# Patient Record
Sex: FEMALE | Race: WHITE | NOT HISPANIC OR LATINO | Employment: UNEMPLOYED | URBAN - NONMETROPOLITAN AREA
[De-identification: names, ages, dates, MRNs, and addresses within clinical notes are randomized per-mention and may not be internally consistent; named-entity substitution may affect disease eponyms.]

---

## 2017-01-17 ENCOUNTER — HOSPITAL ENCOUNTER (OUTPATIENT)
Facility: HOSPITAL | Age: 27
Setting detail: HOSPITAL OUTPATIENT SURGERY
End: 2017-01-17
Attending: OBSTETRICS & GYNECOLOGY | Admitting: OBSTETRICS & GYNECOLOGY

## 2017-01-25 ENCOUNTER — APPOINTMENT (OUTPATIENT)
Dept: PREADMISSION TESTING | Facility: HOSPITAL | Age: 27
End: 2017-01-25

## 2017-01-25 VITALS
WEIGHT: 108.25 LBS | HEIGHT: 63 IN | HEART RATE: 78 BPM | BODY MASS INDEX: 19.18 KG/M2 | SYSTOLIC BLOOD PRESSURE: 109 MMHG | RESPIRATION RATE: 12 BRPM | DIASTOLIC BLOOD PRESSURE: 69 MMHG | OXYGEN SATURATION: 98 %

## 2017-01-25 LAB
ALBUMIN SERPL-MCNC: 4.5 G/DL (ref 3.5–5)
ALBUMIN/GLOB SERPL: 1.4 G/DL (ref 1.1–2.5)
ALP SERPL-CCNC: 92 U/L (ref 24–120)
ALT SERPL W P-5'-P-CCNC: 42 U/L (ref 0–54)
ANION GAP SERPL CALCULATED.3IONS-SCNC: 12 MMOL/L (ref 4–13)
AST SERPL-CCNC: 23 U/L (ref 7–45)
BASOPHILS # BLD AUTO: 0.02 10*3/MM3 (ref 0–0.2)
BASOPHILS NFR BLD AUTO: 0.3 % (ref 0–2)
BILIRUB SERPL-MCNC: 0.5 MG/DL (ref 0.1–1)
BUN BLD-MCNC: 7 MG/DL (ref 5–21)
BUN/CREAT SERPL: 8.9 (ref 7–25)
CALCIUM SPEC-SCNC: 9.2 MG/DL (ref 8.4–10.4)
CHLORIDE SERPL-SCNC: 105 MMOL/L (ref 98–110)
CO2 SERPL-SCNC: 24 MMOL/L (ref 24–31)
CREAT BLD-MCNC: 0.79 MG/DL (ref 0.5–1.4)
DEPRECATED RDW RBC AUTO: 44.4 FL (ref 40–54)
EOSINOPHIL # BLD AUTO: 0.07 10*3/MM3 (ref 0–0.7)
EOSINOPHIL NFR BLD AUTO: 1.2 % (ref 0–4)
ERYTHROCYTE [DISTWIDTH] IN BLOOD BY AUTOMATED COUNT: 14 % (ref 12–15)
GFR SERPL CREATININE-BSD FRML MDRD: 89 ML/MIN/1.73
GLOBULIN UR ELPH-MCNC: 3.2 GM/DL
GLUCOSE BLD-MCNC: 82 MG/DL (ref 70–100)
HCT VFR BLD AUTO: 37.1 % (ref 37–47)
HGB BLD-MCNC: 12.1 G/DL (ref 12–16)
IMM GRANULOCYTES # BLD: 0.01 10*3/MM3 (ref 0–0.03)
IMM GRANULOCYTES NFR BLD: 0.2 % (ref 0–5)
LYMPHOCYTES # BLD AUTO: 2.85 10*3/MM3 (ref 0.72–4.86)
LYMPHOCYTES NFR BLD AUTO: 46.9 % (ref 15–45)
MCH RBC QN AUTO: 28.6 PG (ref 28–32)
MCHC RBC AUTO-ENTMCNC: 32.6 G/DL (ref 33–36)
MCV RBC AUTO: 87.7 FL (ref 82–98)
MONOCYTES # BLD AUTO: 0.33 10*3/MM3 (ref 0.19–1.3)
MONOCYTES NFR BLD AUTO: 5.4 % (ref 4–12)
NEUTROPHILS # BLD AUTO: 2.8 10*3/MM3 (ref 1.87–8.4)
NEUTROPHILS NFR BLD AUTO: 46 % (ref 39–78)
PLATELET # BLD AUTO: 271 10*3/MM3 (ref 130–400)
PMV BLD AUTO: 10.7 FL (ref 6–12)
POTASSIUM BLD-SCNC: 3.5 MMOL/L (ref 3.5–5.3)
PROT SERPL-MCNC: 7.7 G/DL (ref 6.3–8.7)
RBC # BLD AUTO: 4.23 10*6/MM3 (ref 4.2–5.4)
SODIUM BLD-SCNC: 141 MMOL/L (ref 135–145)
WBC NRBC COR # BLD: 6.08 10*3/MM3 (ref 4.8–10.8)

## 2017-01-25 PROCEDURE — 36415 COLL VENOUS BLD VENIPUNCTURE: CPT

## 2017-01-25 PROCEDURE — 80053 COMPREHEN METABOLIC PANEL: CPT | Performed by: OBSTETRICS & GYNECOLOGY

## 2017-01-25 PROCEDURE — 85025 COMPLETE CBC W/AUTO DIFF WBC: CPT | Performed by: OBSTETRICS & GYNECOLOGY

## 2017-01-25 NOTE — DISCHARGE INSTRUCTIONS
DAY OF SURGERY INSTRUCTIONS        YOUR SURGEON: Dr. Sebastian Navas    PROCEDURE: Laparoscopic tubal ligation with Filshie clips.    DATE OF SURGERY: Tuesday, 1/31/17    ARRIVAL TIME: AS DIRECTED BY OFFICE    DAY OF SURGERY TAKE ONLY THESE MEDICATIONS: NONE.        BEFORE YOU COME TO THE HOSPITAL  (Pre-op instructions)  • Do not eat, drink, smoke or chew gum after midnight the night before surgery.  This also includes no mints.  • Morning of surgery take only the medicines you have been instructed with a sip of water unless otherwise instructed  by your physician.  • Do not shave, wear makeup or dark nail polish.  • Remove all jewelry including rings.  • Leave anything you consider valuable at home.  • Leave your suitcase in the car until after your surgery.  • Bring the following with you if applicable:  o Picture ID and insurance, Medicare or Medicaid cards  o Co-pay/deductible required by insurance (cash, check, credit card)  o Medications (no narcotics) in original bottles (not a list) including all over-the-counter medications.  o Copy of advance directive, living will or power-of- documents if not brought to PAT  o CPAP or BIPAP mask and tubing  o Skin prep instruction sheet  o Relaxation aids (MP3 player, book, magazine)  • Confirm your arrival time with you surgeon the day before your surgery (surgery times are subject to change)  • On the day of surgery check in at registration located at the main entrance of the hospital.       Outpatient Surgery Guidelines, Adult  Outpatient procedures are those for which the person having the procedure is allowed to go home the same day as the procedure. Various procedures are done on an outpatient basis. You should follow some general guidelines if you will be having an outpatient procedure.  LET YOUR HEALTH CARE PROVIDER KNOW ABOUT:  · Any allergies you have.  · All medicines you are taking, including vitamins, herbs, eye drops, creams, and over-the-counter  medicines.  · Previous problems you or members of your family have had with the use of anesthetics.  · Any blood disorders you have.  · Previous surgeries you have had.  · Medical conditions you have.  RISKS AND COMPLICATIONS  Your health care provider will discuss possible risks and complications with you before surgery. Common risks and complications include:    · Problems due to the use of anesthetics.  · Blood loss and replacement (does not apply to minor surgical procedures).  · Temporary increase in pain due to surgery.  · Uncorrected pain or problems that the surgery was meant to correct.  · Infection.  · New damage.  BEFORE THE PROCEDURE  · Ask your health care provider about changing or stopping your regular medicines. You may need to stop taking certain medicines in the days or weeks before the procedure.  · Stop smoking at least 2 weeks before surgery. This lowers your risk for complications during and after surgery. Ask your health care provider for help with this if needed.  · Eat your usual meals and a light supper the day before surgery. Continue fluid intake. Do not drink alcohol.  · Do not eat or drink after midnight the night before your surgery.   · Arrange for someone to take you home and to stay with you for 24 hours after the procedure. Medicine given for your procedure may affect your ability to drive or to care for yourself.  · Call your health care provider's office if you develop an illness or problem that may prevent you from safely having your procedure.  AFTER THE PROCEDURE  After surgery, you will be taken to a recovery area, where your progress will be monitored. If there are no complications, you will be allowed to go home when you are awake, stable, and taking fluids well. You may have numbness around the surgical site. Healing will take some time. You will have tenderness at the surgical site and may have some swelling and bruising. You may also have some nausea.  HOME CARE  INSTRUCTIONS  · Do not drive for 24 hours, or as directed by your health care provider. Do not drive while taking prescription pain medicines.  · Do not drink alcohol for 24 hours.  · Do not make important decisions or sign legal documents for 24 hours.  · You may resume a normal diet and activities as directed.  · Do not lift anything heavier than 10 pounds (4.5 kg) or play contact sports until your health care provider says it is okay.  · Change your bandages (dressings) as directed.  · Only take over-the-counter or prescription medicines as directed by your health care provider.  · Follow up with your health care provider as directed.  SEEK MEDICAL CARE IF:  · You have increased bleeding (more than a small spot) from the surgical site.  · You have redness, swelling, or increasing pain in the wound.  · You see pus coming from the wound.  · You have a fever.  · You notice a bad smell coming from the wound or dressing.  · You feel lightheaded or faint.  · You develop a rash.  · You have trouble breathing.  · You develop allergies.  MAKE SURE YOU:  · Understand these instructions.  · Will watch your condition.  · Will get help right away if you are not doing well or get worse.     This information is not intended to replace advice given to you by your health care provider. Make sure you discuss any questions you have with your health care provider.     Document Released: 09/12/2002 Document Revised: 05/03/2016 Document Reviewed: 05/22/2014  [employer] Interactive Patient Education ©2016 [employer]       Fall Prevention in Hospitals, Adult  As a hospital patient, your condition and the treatments you receive can increase your risk for falls. Some additional risk factors for falls in a hospital include:  · Being in an unfamiliar environment.  · Being on bed rest.  · Your surgery.  · Taking certain medicines.  · Your tubing requirements, such as intravenous (IV) therapy or catheters.  It is important that you learn how  to decrease fall risks while at the hospital. Below are important tips that can help prevent falls.  SAFETY TIPS FOR PREVENTING FALLS  Talk about your risk of falling.  · Ask your health care provider why you are at risk for falling. Is it your medicine, illness, tubing placement, or something else?  · Make a plan with your health care provider to keep you safe from falls.  · Ask your health care provider or pharmacist about side effects of your medicines. Some medicines can make you dizzy or affect your coordination.  Ask for help.  · Ask for help before getting out of bed. You may need to press your call button.  · Ask for assistance in getting safely to the toilet.  · Ask for a walker or cane to be put at your bedside. Ask that most of the side rails on your bed be placed up before your health care provider leaves the room.  · Ask family or friends to sit with you.  · Ask for things that are out of your reach, such as your glasses, hearing aids, telephone, bedside table, or call button.  Follow these tips to avoid falling:  · Stay lying or seated, rather than standing, while waiting for help.  · Wear rubber-soled slippers or shoes whenever you walk in the hospital.  · Avoid quick, sudden movements.  ¨ Change positions slowly.  ¨ Sit on the side of your bed before standing.  ¨ Stand up slowly and wait before you start to walk.  · Let your health care provider know if there is a spill on the floor.  · Pay careful attention to the medical equipment, electrical cords, and tubes around you.  · When you need help, use your call button by your bed or in the bathroom. Wait for one of your health care providers to help you.  · If you feel dizzy or unsure of your footing, return to bed and wait for assistance.  · Avoid being distracted by the TV, telephone, or another person in your room.  · Do not lean or support yourself on rolling objects, such as IV poles or bedside tables.     This information is not intended to  replace advice given to you by your health care provider. Make sure you discuss any questions you have with your health care provider.     Document Released: 12/15/2001 Document Revised: 01/08/2016 Document Reviewed: 08/25/2013  [employer] Interactive Patient Education ©2016 [employer]       Surgical Site Infections FAQs  What is a Surgical Site Infection (SSI)?  A surgical site infection is an infection that occurs after surgery in the part of the body where the surgery took place. Most patients who have surgery do not develop an infection. However, infections develop in about 1 to 3 out of every 100 patients who have surgery.  Some of the common symptoms of a surgical site infection are:  · Redness and pain around the area where you had surgery  · Drainage of cloudy fluid from your surgical wound  · Fever  Can SSIs be treated?  Yes. Most surgical site infections can be treated with antibiotics. The antibiotic given to you depends on the bacteria (germs) causing the infection. Sometimes patients with SSIs also need another surgery to treat the infection.  What are some of the things that hospitals are doing to prevent SSIs?  To prevent SSIs, doctors, nurses, and other healthcare providers:  · Clean their hands and arms up to their elbows with an antiseptic agent just before the surgery.  · Clean their hands with soap and water or an alcohol-based hand rub before and after caring for each patient.  · May remove some of your hair immediately before your surgery using electric clippers if the hair is in the same area where the procedure will occur. They should not shave you with a razor.  · Wear special hair covers, masks, gowns, and gloves during surgery to keep the surgery area clean.  · Give you antibiotics before your surgery starts. In most cases, you should get antibiotics within 60 minutes before the surgery starts and the antibiotics should be stopped within 24 hours after surgery.  · Clean the skin at the  site of your surgery with a special soap that kills germs.  What can I do to help prevent SSIs?  Before your surgery:  · Tell your doctor about other medical problems you may have. Health problems such as allergies, diabetes, and obesity could affect your surgery and your treatment.  · Quit smoking. Patients who smoke get more infections. Talk to your doctor about how you can quit before your surgery.  · Do not shave near where you will have surgery. Shaving with a razor can irritate your skin and make it easier to develop an infection.  At the time of your surgery:  · Speak up if someone tries to shave you with a razor before surgery. Ask why you need to be shaved and talk with your surgeon if you have any concerns.  · Ask if you will get antibiotics before surgery.  After your surgery:  · Make sure that your healthcare providers clean their hands before examining you, either with soap and water or an alcohol-based hand rub.  · If you do not see your providers clean their hands, please ask them to do so.  · Family and friends who visit you should not touch the surgical wound or dressings.  · Family and friends should clean their hands with soap and water or an alcohol-based hand rub before and after visiting you. If you do not see them clean their hands, ask them to clean their hands.  What do I need to do when I go home from the hospital?  · Before you go home, your doctor or nurse should explain everything you need to know about taking care of your wound. Make sure you understand how to care for your wound before you leave the hospital.  · Always clean your hands before and after caring for your wound.  · Before you go home, make sure you know who to contact if you have questions or problems after you get home.  · If you have any symptoms of an infection, such as redness and pain at the surgery site, drainage, or fever, call your doctor immediately.  If you have additional questions, please ask your doctor or  nurse.  Developed and co-sponsored by The Society for Healthcare Epidemiology of [place] (SHEA); Infectious Diseases Society of [place] (IDSA); American Hospital Association; Association for Professionals in Infection Control and Epidemiology (APIC); Centers for Disease Control and Prevention (CDC); and The Joint Commission.     This information is not intended to replace advice given to you by your health care provider. Make sure you discuss any questions you have with your health care provider.     Document Released: 12/23/2014 Document Revised: 01/08/2016 Document Reviewed: 03/02/2016  [employer] Interactive Patient Education ©2016 [employer]       [facility]  CHG 4% Patient Instruction Sheet    Preparing the Skin Before Surgery  Preparing or “prepping” skin before surgery can reduce the risk of infection at the surgical site. To make the process easier,[facility] has chosen 4% Chlorhexidine Gluconate (CHG) antiseptic solution.   The steps below outline the prepping process and should be carefully followed.                                                                                                                                                      Prep the skin at the following time(s):                                                      We recommend you shower the night before surgery, and again the morning of surgery with the 4% CHG antiseptic solution using half of the bottle and a cloth each time.  Dress in clean clothes/sleepwear after showering.  See instructions below for application.          Do not apply any lotions or moisturizers.       Do not shave the area to be prepped for at least 2 days prior to surgery.    Clipping the hair may be done immediately prior to your surgery at the hospital    if needed.    Directions:  Thoroughly rinse your body with water.  Apply 4% CHG to a cloth and wash skin gently, paying special attention to the operative site.  Rinse again thoroughly.  Once you  have begun using this product do not apply anything else to your skin. If itching or redness persists, rinse affected areas and discontinue use.    When using this product:  • Keep out of eyes, ears, and mouth.  • If solution should contact these areas, rinse out promptly and thoroughly with water.  • For external use only.  • Do not use in genital area, on your face or head.

## 2017-01-25 NOTE — MR AVS SNAPSHOT
"                        Juanita Cedillo   2017 1:30 PM   Appointment    Provider:  [facility] PAT 34 RM 4   Department:  [facility] PREADMISSION T   Dept Phone:  [phone]                Your Full Care Plan              Your Updated Medication List          This list is accurate as of: 17  1:43 PM.  Always use your most recent med list.                ferrous sulfate 325 (65 FE) MG tablet               Bilibot Signup     [facility] Bilibot allows you to send messages to your doctor, view your test results, renew your prescriptions, schedule appointments, and more. To sign up, go to [email or web address] and click on the Sign Up Now link in the New User? box. Enter your Bilibot Activation Code exactly as it appears below along with the last four digits of your Social Security Number and your Date of Birth () to complete the sign-up process. If you do not sign up before the expiration date, you must request a new code.    Bilibot Activation Code: [email or web address]  Expires: 2017  1:42 PM    If you have questions, you can email [email or web address] or call [phone] to talk to our Bilibot staff. Remember, Bilibot is NOT to be used for urgent needs. For medical emergencies, dial 911.               Other Info from Your Visit           Allergies     No Known Allergies      Vital Signs     Blood Pressure Pulse Respirations Height Weight Last Menstrual Period    109/69 (BP Location: Right arm) 78 12 63\" (160 cm) 108 lb 4 oz (49.1 kg) 2017    Oxygen Saturation Body Mass Index Smoking Status             98% 19.18 kg/m2 Former Smoker           Discharge Instructions         DAY OF SURGERY INSTRUCTIONS        YOUR SURGEON: Dr. Sebastian Navas    PROCEDURE: Laparoscopic tubal ligation with Filshie clips.    DATE OF SURGERY: Tuesday, 17    ARRIVAL TIME: AS DIRECTED BY OFFICE    DAY OF SURGERY TAKE ONLY THESE MEDICATIONS: NONE.        BEFORE YOU COME TO THE HOSPITAL  (Pre-op " instructions)  • Do not eat, drink, smoke or chew gum after midnight the night before surgery.  This also includes no mints.  • Morning of surgery take only the medicines you have been instructed with a sip of water unless otherwise instructed  by your physician.  • Do not shave, wear makeup or dark nail polish.  • Remove all jewelry including rings.  • Leave anything you consider valuable at home.  • Leave your suitcase in the car until after your surgery.  • Bring the following with you if applicable:  o Picture ID and insurance, Medicare or Medicaid cards  o Co-pay/deductible required by insurance (cash, check, credit card)  o Medications (no narcotics) in original bottles (not a list) including all over-the-counter medications.  o Copy of advance directive, living will or power-of- documents if not brought to PAT  o CPAP or BIPAP mask and tubing  o Skin prep instruction sheet  o Relaxation aids (MP3 player, book, magazine)  • Confirm your arrival time with you surgeon the day before your surgery (surgery times are subject to change)  • On the day of surgery check in at registration located at the main entrance of the hospital.       Outpatient Surgery Guidelines, Adult  Outpatient procedures are those for which the person having the procedure is allowed to go home the same day as the procedure. Various procedures are done on an outpatient basis. You should follow some general guidelines if you will be having an outpatient procedure.  LET YOUR HEALTH CARE PROVIDER KNOW ABOUT:  · Any allergies you have.  · All medicines you are taking, including vitamins, herbs, eye drops, creams, and over-the-counter medicines.  · Previous problems you or members of your family have had with the use of anesthetics.  · Any blood disorders you have.  · Previous surgeries you have had.  · Medical conditions you have.  RISKS AND COMPLICATIONS  Your health care provider will discuss possible risks and complications with you  before surgery. Common risks and complications include:    · Problems due to the use of anesthetics.  · Blood loss and replacement (does not apply to minor surgical procedures).  · Temporary increase in pain due to surgery.  · Uncorrected pain or problems that the surgery was meant to correct.  · Infection.  · New damage.  BEFORE THE PROCEDURE  · Ask your health care provider about changing or stopping your regular medicines. You may need to stop taking certain medicines in the days or weeks before the procedure.  · Stop smoking at least 2 weeks before surgery. This lowers your risk for complications during and after surgery. Ask your health care provider for help with this if needed.  · Eat your usual meals and a light supper the day before surgery. Continue fluid intake. Do not drink alcohol.  · Do not eat or drink after midnight the night before your surgery.   · Arrange for someone to take you home and to stay with you for 24 hours after the procedure. Medicine given for your procedure may affect your ability to drive or to care for yourself.  · Call your health care provider's office if you develop an illness or problem that may prevent you from safely having your procedure.  AFTER THE PROCEDURE  After surgery, you will be taken to a recovery area, where your progress will be monitored. If there are no complications, you will be allowed to go home when you are awake, stable, and taking fluids well. You may have numbness around the surgical site. Healing will take some time. You will have tenderness at the surgical site and may have some swelling and bruising. You may also have some nausea.  HOME CARE INSTRUCTIONS  · Do not drive for 24 hours, or as directed by your health care provider. Do not drive while taking prescription pain medicines.  · Do not drink alcohol for 24 hours.  · Do not make important decisions or sign legal documents for 24 hours.  · You may resume a normal diet and activities as  directed.  · Do not lift anything heavier than 10 pounds (4.5 kg) or play contact sports until your health care provider says it is okay.  · Change your bandages (dressings) as directed.  · Only take over-the-counter or prescription medicines as directed by your health care provider.  · Follow up with your health care provider as directed.  SEEK MEDICAL CARE IF:  · You have increased bleeding (more than a small spot) from the surgical site.  · You have redness, swelling, or increasing pain in the wound.  · You see pus coming from the wound.  · You have a fever.  · You notice a bad smell coming from the wound or dressing.  · You feel lightheaded or faint.  · You develop a rash.  · You have trouble breathing.  · You develop allergies.  MAKE SURE YOU:  · Understand these instructions.  · Will watch your condition.  · Will get help right away if you are not doing well or get worse.     This information is not intended to replace advice given to you by your health care provider. Make sure you discuss any questions you have with your health care provider.     Document Released: 09/12/2002 Document Revised: 05/03/2016 Document Reviewed: 05/22/2014  [employer] Interactive Patient Education ©2016 [employer]       Fall Prevention in Hospitals, Adult  As a hospital patient, your condition and the treatments you receive can increase your risk for falls. Some additional risk factors for falls in a hospital include:  · Being in an unfamiliar environment.  · Being on bed rest.  · Your surgery.  · Taking certain medicines.  · Your tubing requirements, such as intravenous (IV) therapy or catheters.  It is important that you learn how to decrease fall risks while at the hospital. Below are important tips that can help prevent falls.  SAFETY TIPS FOR PREVENTING FALLS  Talk about your risk of falling.  · Ask your health care provider why you are at risk for falling. Is it your medicine, illness, tubing placement, or something  else?  · Make a plan with your health care provider to keep you safe from falls.  · Ask your health care provider or pharmacist about side effects of your medicines. Some medicines can make you dizzy or affect your coordination.  Ask for help.  · Ask for help before getting out of bed. You may need to press your call button.  · Ask for assistance in getting safely to the toilet.  · Ask for a walker or cane to be put at your bedside. Ask that most of the side rails on your bed be placed up before your health care provider leaves the room.  · Ask family or friends to sit with you.  · Ask for things that are out of your reach, such as your glasses, hearing aids, telephone, bedside table, or call button.  Follow these tips to avoid falling:  · Stay lying or seated, rather than standing, while waiting for help.  · Wear rubber-soled slippers or shoes whenever you walk in the hospital.  · Avoid quick, sudden movements.  ¨ Change positions slowly.  ¨ Sit on the side of your bed before standing.  ¨ Stand up slowly and wait before you start to walk.  · Let your health care provider know if there is a spill on the floor.  · Pay careful attention to the medical equipment, electrical cords, and tubes around you.  · When you need help, use your call button by your bed or in the bathroom. Wait for one of your health care providers to help you.  · If you feel dizzy or unsure of your footing, return to bed and wait for assistance.  · Avoid being distracted by the TV, telephone, or another person in your room.  · Do not lean or support yourself on rolling objects, such as IV poles or bedside tables.     This information is not intended to replace advice given to you by your health care provider. Make sure you discuss any questions you have with your health care provider.     Document Released: 12/15/2001 Document Revised: 01/08/2016 Document Reviewed: 08/25/2013  Elsevier Interactive Patient Education ©2016 Elsevier  Inc.       Surgical Site Infections FAQs  What is a Surgical Site Infection (SSI)?  A surgical site infection is an infection that occurs after surgery in the part of the body where the surgery took place. Most patients who have surgery do not develop an infection. However, infections develop in about 1 to 3 out of every 100 patients who have surgery.  Some of the common symptoms of a surgical site infection are:  · Redness and pain around the area where you had surgery  · Drainage of cloudy fluid from your surgical wound  · Fever  Can SSIs be treated?  Yes. Most surgical site infections can be treated with antibiotics. The antibiotic given to you depends on the bacteria (germs) causing the infection. Sometimes patients with SSIs also need another surgery to treat the infection.  What are some of the things that hospitals are doing to prevent SSIs?  To prevent SSIs, doctors, nurses, and other healthcare providers:  · Clean their hands and arms up to their elbows with an antiseptic agent just before the surgery.  · Clean their hands with soap and water or an alcohol-based hand rub before and after caring for each patient.  · May remove some of your hair immediately before your surgery using electric clippers if the hair is in the same area where the procedure will occur. They should not shave you with a razor.  · Wear special hair covers, masks, gowns, and gloves during surgery to keep the surgery area clean.  · Give you antibiotics before your surgery starts. In most cases, you should get antibiotics within 60 minutes before the surgery starts and the antibiotics should be stopped within 24 hours after surgery.  · Clean the skin at the site of your surgery with a special soap that kills germs.  What can I do to help prevent SSIs?  Before your surgery:  · Tell your doctor about other medical problems you may have. Health problems such as allergies, diabetes, and obesity could affect your surgery and your  treatment.  · Quit smoking. Patients who smoke get more infections. Talk to your doctor about how you can quit before your surgery.  · Do not shave near where you will have surgery. Shaving with a razor can irritate your skin and make it easier to develop an infection.  At the time of your surgery:  · Speak up if someone tries to shave you with a razor before surgery. Ask why you need to be shaved and talk with your surgeon if you have any concerns.  · Ask if you will get antibiotics before surgery.  After your surgery:  · Make sure that your healthcare providers clean their hands before examining you, either with soap and water or an alcohol-based hand rub.  · If you do not see your providers clean their hands, please ask them to do so.  · Family and friends who visit you should not touch the surgical wound or dressings.  · Family and friends should clean their hands with soap and water or an alcohol-based hand rub before and after visiting you. If you do not see them clean their hands, ask them to clean their hands.  What do I need to do when I go home from the hospital?  · Before you go home, your doctor or nurse should explain everything you need to know about taking care of your wound. Make sure you understand how to care for your wound before you leave the hospital.  · Always clean your hands before and after caring for your wound.  · Before you go home, make sure you know who to contact if you have questions or problems after you get home.  · If you have any symptoms of an infection, such as redness and pain at the surgery site, drainage, or fever, call your doctor immediately.  If you have additional questions, please ask your doctor or nurse.  Developed and co-sponsored by The Society for Healthcare Epidemiology of [place] (SHEA); Infectious Diseases Society of [place] (IDSA); American Hospital Association; Association for Professionals in Infection Control and Epidemiology (APIC); Centers for Disease  Control and Prevention (CDC); and The Joint Commission.     This information is not intended to replace advice given to you by your health care provider. Make sure you discuss any questions you have with your health care provider.     Document Released: 12/23/2014 Document Revised: 01/08/2016 Document Reviewed: 03/02/2016  [employer] Interactive Patient Education ©2016 Elsevier Inc.       [facility]  CHG 4% Patient Instruction Sheet    Preparing the Skin Before Surgery  Preparing or “prepping” skin before surgery can reduce the risk of infection at the surgical site. To make the process easier,[facility] has chosen 4% Chlorhexidine Gluconate (CHG) antiseptic solution.   The steps below outline the prepping process and should be carefully followed.                                                                                                                                                      Prep the skin at the following time(s):                                                      We recommend you shower the night before surgery, and again the morning of surgery with the 4% CHG antiseptic solution using half of the bottle and a cloth each time.  Dress in clean clothes/sleepwear after showering.  See instructions below for application.          Do not apply any lotions or moisturizers.       Do not shave the area to be prepped for at least 2 days prior to surgery.    Clipping the hair may be done immediately prior to your surgery at the hospital    if needed.    Directions:  Thoroughly rinse your body with water.  Apply 4% CHG to a cloth and wash skin gently, paying special attention to the operative site.  Rinse again thoroughly.  Once you have begun using this product do not apply anything else to your skin. If itching or redness persists, rinse affected areas and discontinue use.    When using this product:  • Keep out of eyes, ears, and mouth.  • If solution should contact these areas, rinse out promptly  and thoroughly with water.  • For external use only.  • Do not use in genital area, on your face or head.             SYMPTOMS OF A STROKE    Call 911 or have someone take you to the Emergency Department if you have any of the following:    · Sudden numbness or weakness of your face, arm or leg especially on one side of the body  · Sudden confusion, diffiiculty speaking or trouble understanding   · Changes in your vision or loss of sight in one eye  · Sudden severe headache with no known cause  · sudden dizziness, trouble walking, loss of balance or coordination    It is important to seek emergency care right away if you have further stroke symptoms. If you get emergency help quickly, the powerful clot-dissolving medicines can reduce the disabilities caused by a stroke.     For more information:    American Stroke Association  5-281-7-STROKE  www.strokeassociation.org           IF YOU SMOKE OR USE TOBACCO PLEASE READ THE FOLLOWING:    Why is smoking bad for me?  Smoking increases the risk of heart disease, lung disease, vascular disease, stroke, and cancer.     If you smoke, STOP!    If you would like more information on quitting smoking, please visit the [employer] website: [email or web address]   This link will provide additional resources including the QUIT line and the [employer] the [employer] support groups.     For more information:    American Cancer Society  ([phone]    American Heart Association  1-157.356.2212

## 2018-04-24 ENCOUNTER — HOSPITAL ENCOUNTER (OUTPATIENT)
Dept: CT IMAGING | Age: 28
Discharge: HOME | End: 2018-04-24
Payer: COMMERCIAL

## 2018-04-24 ENCOUNTER — HOSPITAL ENCOUNTER (OUTPATIENT)
Dept: GENERAL RADIOLOGY | Age: 28
Discharge: HOME | End: 2018-04-24
Payer: COMMERCIAL

## 2018-04-24 ENCOUNTER — OFFICE VISIT (OUTPATIENT)
Dept: URGENT CARE | Age: 28
End: 2018-04-24
Payer: COMMERCIAL

## 2018-04-24 VITALS
OXYGEN SATURATION: 97 % | SYSTOLIC BLOOD PRESSURE: 112 MMHG | TEMPERATURE: 97.6 F | HEART RATE: 95 BPM | DIASTOLIC BLOOD PRESSURE: 70 MMHG | RESPIRATION RATE: 20 BRPM | HEIGHT: 63 IN | WEIGHT: 102.6 LBS | BODY MASS INDEX: 18.18 KG/M2

## 2018-04-24 DIAGNOSIS — R11.2 NON-INTRACTABLE VOMITING WITH NAUSEA, UNSPECIFIED VOMITING TYPE: ICD-10-CM

## 2018-04-24 DIAGNOSIS — N20.0 RENAL CALCULUS OR STONE: ICD-10-CM

## 2018-04-24 DIAGNOSIS — K21.9 GASTROESOPHAGEAL REFLUX DISEASE WITHOUT ESOPHAGITIS: Primary | ICD-10-CM

## 2018-04-24 DIAGNOSIS — R10.84 GENERALIZED ABDOMINAL PAIN: ICD-10-CM

## 2018-04-24 DIAGNOSIS — R31.1 BENIGN ESSENTIAL MICROSCOPIC HEMATURIA: ICD-10-CM

## 2018-04-24 DIAGNOSIS — R10.9 ABDOMINAL PAIN, UNSPECIFIED ABDOMINAL LOCATION: Primary | ICD-10-CM

## 2018-04-24 LAB
ALBUMIN SERPL-MCNC: 4.5 G/DL (ref 3.5–5.2)
ALP BLD-CCNC: 64 U/L (ref 35–104)
ALT SERPL-CCNC: 12 U/L (ref 5–33)
AMYLASE: 53 U/L (ref 28–100)
ANION GAP SERPL CALCULATED.3IONS-SCNC: 11 MMOL/L (ref 7–19)
APPEARANCE FLUID: ABNORMAL
AST SERPL-CCNC: 12 U/L (ref 5–32)
BASOPHILS ABSOLUTE: 0 K/UL (ref 0–0.2)
BASOPHILS RELATIVE PERCENT: 0.7 % (ref 0–1)
BILIRUB SERPL-MCNC: 0.3 MG/DL (ref 0.2–1.2)
BILIRUBIN, POC: NEGATIVE
BLOOD URINE, POC: ABNORMAL
BUN BLDV-MCNC: 10 MG/DL (ref 6–20)
CALCIUM SERPL-MCNC: 9.1 MG/DL (ref 8.6–10)
CHLORIDE BLD-SCNC: 105 MMOL/L (ref 98–111)
CLARITY, POC: CLEAR
CO2: 25 MMOL/L (ref 22–29)
COLOR, POC: YELLOW
CONTROL: NORMAL
CREAT SERPL-MCNC: 0.6 MG/DL (ref 0.5–0.9)
EOSINOPHILS ABSOLUTE: 0.2 K/UL (ref 0–0.6)
EOSINOPHILS RELATIVE PERCENT: 3.8 % (ref 0–5)
GFR NON-AFRICAN AMERICAN: >60
GLUCOSE BLD-MCNC: 81 MG/DL (ref 74–109)
GLUCOSE URINE, POC: NEGATIVE
HCT VFR BLD CALC: 41.7 % (ref 37–47)
HEMOGLOBIN: 13.5 G/DL (ref 12–16)
KETONES, POC: ABNORMAL
LEUKOCYTE EST, POC: NEGATIVE
LIPASE: 20 U/L (ref 13–60)
LYMPHOCYTES ABSOLUTE: 2.3 K/UL (ref 1.1–4.5)
LYMPHOCYTES RELATIVE PERCENT: 39.8 % (ref 20–40)
MCH RBC QN AUTO: 29.7 PG (ref 27–31)
MCHC RBC AUTO-ENTMCNC: 32.4 G/DL (ref 33–37)
MCV RBC AUTO: 91.9 FL (ref 81–99)
MONOCYTES ABSOLUTE: 0.3 K/UL (ref 0–0.9)
MONOCYTES RELATIVE PERCENT: 4.9 % (ref 0–10)
NEUTROPHILS ABSOLUTE: 2.9 K/UL (ref 1.5–7.5)
NEUTROPHILS RELATIVE PERCENT: 50.5 % (ref 50–65)
NITRITE, POC: NEGATIVE
PDW BLD-RTO: 13.2 % (ref 11.5–14.5)
PH, POC: 5.5
PLATELET # BLD: 325 K/UL (ref 130–400)
PMV BLD AUTO: 10 FL (ref 9.4–12.3)
POTASSIUM SERPL-SCNC: 4.1 MMOL/L (ref 3.5–5)
PREGNANCY TEST URINE, POC: NEGATIVE
PROTEIN, POC: NEGATIVE
RBC # BLD: 4.54 M/UL (ref 4.2–5.4)
SODIUM BLD-SCNC: 141 MMOL/L (ref 136–145)
SPECIFIC GRAVITY, POC: 1.03
TOTAL PROTEIN: 7.2 G/DL (ref 6.6–8.7)
UROBILINOGEN, POC: 0.2
WBC # BLD: 5.7 K/UL (ref 4.8–10.8)

## 2018-04-24 PROCEDURE — 74176 CT ABD & PELVIS W/O CONTRAST: CPT

## 2018-04-24 PROCEDURE — 74018 RADEX ABDOMEN 1 VIEW: CPT

## 2018-04-24 PROCEDURE — 81002 URINALYSIS NONAUTO W/O SCOPE: CPT | Performed by: NURSE PRACTITIONER

## 2018-04-24 PROCEDURE — 81025 URINE PREGNANCY TEST: CPT | Performed by: NURSE PRACTITIONER

## 2018-04-24 PROCEDURE — 99214 OFFICE O/P EST MOD 30 MIN: CPT | Performed by: NURSE PRACTITIONER

## 2018-04-24 RX ORDER — TAMSULOSIN HYDROCHLORIDE 0.4 MG/1
0.4 CAPSULE ORAL DAILY
Qty: 7 CAPSULE | Refills: 0 | Status: SHIPPED | OUTPATIENT
Start: 2018-04-24 | End: 2018-05-01

## 2018-04-24 RX ORDER — RANITIDINE 150 MG/1
150 TABLET ORAL 2 TIMES DAILY
Qty: 60 TABLET | Refills: 0 | Status: SHIPPED | OUTPATIENT
Start: 2018-04-24

## 2018-04-24 ASSESSMENT — ENCOUNTER SYMPTOMS
CONSTIPATION: 0
NAUSEA: 1
BACK PAIN: 1
SHORTNESS OF BREATH: 0
VOMITING: 1
ABDOMINAL PAIN: 1
DIARRHEA: 0
BLOOD IN STOOL: 0

## 2020-05-09 LAB — EXTERNAL HEPATITIS B SURFACE ANTIGEN: NEGATIVE

## 2020-11-09 LAB
EXTERNAL ABO GROUPING: NORMAL
EXTERNAL ANTIBODY SCREEN: NEGATIVE
EXTERNAL HEPATITIS C AB: NEGATIVE
EXTERNAL RH FACTOR: POSITIVE
EXTERNAL RUBELLA QUALITATIVE: NORMAL
EXTERNAL SYPHILIS RPR SCREEN: NORMAL
HIV1 P24 AG SERPL QL IA: NORMAL

## 2021-04-07 ENCOUNTER — HOSPITAL ENCOUNTER (OUTPATIENT)
Facility: HOSPITAL | Age: 31
Discharge: HOME | End: 2021-04-07
Attending: OBSTETRICS & GYNECOLOGY | Admitting: OBSTETRICS & GYNECOLOGY

## 2021-04-07 ENCOUNTER — HOSPITAL ENCOUNTER (OUTPATIENT)
Facility: HOSPITAL | Age: 31
End: 2021-04-07
Attending: OBSTETRICS & GYNECOLOGY | Admitting: OBSTETRICS & GYNECOLOGY

## 2021-04-07 VITALS
HEART RATE: 102 BPM | DIASTOLIC BLOOD PRESSURE: 66 MMHG | TEMPERATURE: 98.9 F | WEIGHT: 137.6 LBS | BODY MASS INDEX: 24.38 KG/M2 | SYSTOLIC BLOOD PRESSURE: 104 MMHG | HEIGHT: 63 IN | RESPIRATION RATE: 18 BRPM

## 2021-04-07 LAB
ALBUMIN SERPL-MCNC: 3.4 G/DL (ref 3.5–5.2)
ALBUMIN/GLOB SERPL: 1.2 G/DL
ALP SERPL-CCNC: 95 U/L (ref 39–117)
ALT SERPL W P-5'-P-CCNC: <5 U/L (ref 1–33)
ANION GAP SERPL CALCULATED.3IONS-SCNC: 7 MMOL/L (ref 5–15)
AST SERPL-CCNC: 18 U/L (ref 1–32)
BASOPHILS # BLD AUTO: 0.03 10*3/MM3 (ref 0–0.2)
BASOPHILS NFR BLD AUTO: 0.3 % (ref 0–1.5)
BILIRUB SERPL-MCNC: <0.2 MG/DL (ref 0–1.2)
BILIRUB UR QL STRIP: NEGATIVE
BLOODY SPECIMEN?: NO
BUN SERPL-MCNC: 5 MG/DL (ref 6–20)
BUN/CREAT SERPL: 13.5 (ref 7–25)
CALCIUM SPEC-SCNC: 8.9 MG/DL (ref 8.6–10.5)
CHLORIDE SERPL-SCNC: 108 MMOL/L (ref 98–107)
CLARITY UR: ABNORMAL
CO2 SERPL-SCNC: 23 MMOL/L (ref 22–29)
COLOR UR: YELLOW
CREAT SERPL-MCNC: 0.37 MG/DL (ref 0.57–1)
DEPRECATED RDW RBC AUTO: 43.2 FL (ref 37–54)
EOSINOPHIL # BLD AUTO: 0.23 10*3/MM3 (ref 0–0.4)
EOSINOPHIL NFR BLD AUTO: 2.1 % (ref 0.3–6.2)
ERYTHROCYTE [DISTWIDTH] IN BLOOD BY AUTOMATED COUNT: 13.2 % (ref 12.3–15.4)
FIBRONECTIN FETAL VAG QL: NEGATIVE
GFR SERPL CREATININE-BSD FRML MDRD: >150 ML/MIN/1.73
GLOBULIN UR ELPH-MCNC: 2.8 GM/DL
GLUCOSE SERPL-MCNC: 94 MG/DL (ref 65–99)
GLUCOSE UR STRIP-MCNC: NEGATIVE MG/DL
HCT VFR BLD AUTO: 27.6 % (ref 34–46.6)
HGB BLD-MCNC: 9.2 G/DL (ref 12–15.9)
HGB UR QL STRIP.AUTO: NEGATIVE
IMM GRANULOCYTES # BLD AUTO: 0.06 10*3/MM3 (ref 0–0.05)
IMM GRANULOCYTES NFR BLD AUTO: 0.5 % (ref 0–0.5)
KETONES UR QL STRIP: NEGATIVE
LEUKOCYTE ESTERASE UR QL STRIP.AUTO: NEGATIVE
LYMPHOCYTES # BLD AUTO: 3.29 10*3/MM3 (ref 0.7–3.1)
LYMPHOCYTES NFR BLD AUTO: 29.7 % (ref 19.6–45.3)
MCH RBC QN AUTO: 30 PG (ref 26.6–33)
MCHC RBC AUTO-ENTMCNC: 33.3 G/DL (ref 31.5–35.7)
MCV RBC AUTO: 89.9 FL (ref 79–97)
MONOCYTES # BLD AUTO: 0.61 10*3/MM3 (ref 0.1–0.9)
MONOCYTES NFR BLD AUTO: 5.5 % (ref 5–12)
NEUTROPHILS NFR BLD AUTO: 6.84 10*3/MM3 (ref 1.7–7)
NEUTROPHILS NFR BLD AUTO: 61.9 % (ref 42.7–76)
NITRITE UR QL STRIP: NEGATIVE
NRBC BLD AUTO-RTO: 0 /100 WBC (ref 0–0.2)
PH UR STRIP.AUTO: 8 [PH] (ref 5–8)
PLATELET # BLD AUTO: 343 10*3/MM3 (ref 140–450)
PMV BLD AUTO: 9.7 FL (ref 6–12)
POTASSIUM SERPL-SCNC: 3.4 MMOL/L (ref 3.5–5.2)
PROT SERPL-MCNC: 6.2 G/DL (ref 6–8.5)
PROT UR QL STRIP: NEGATIVE
RBC # BLD AUTO: 3.07 10*6/MM3 (ref 3.77–5.28)
SODIUM SERPL-SCNC: 138 MMOL/L (ref 136–145)
SP GR UR STRIP: 1.01 (ref 1–1.03)
UROBILINOGEN UR QL STRIP: ABNORMAL
WBC # BLD AUTO: 11.06 10*3/MM3 (ref 3.4–10.8)

## 2021-04-07 PROCEDURE — 36415 COLL VENOUS BLD VENIPUNCTURE: CPT | Performed by: OBSTETRICS & GYNECOLOGY

## 2021-04-07 PROCEDURE — 85025 COMPLETE CBC W/AUTO DIFF WBC: CPT | Performed by: OBSTETRICS & GYNECOLOGY

## 2021-04-07 PROCEDURE — 81003 URINALYSIS AUTO W/O SCOPE: CPT | Performed by: OBSTETRICS & GYNECOLOGY

## 2021-04-07 PROCEDURE — 82731 ASSAY OF FETAL FIBRONECTIN: CPT | Performed by: OBSTETRICS & GYNECOLOGY

## 2021-04-07 PROCEDURE — 80053 COMPREHEN METABOLIC PANEL: CPT | Performed by: OBSTETRICS & GYNECOLOGY

## 2021-04-07 PROCEDURE — G0463 HOSPITAL OUTPT CLINIC VISIT: HCPCS

## 2021-04-07 PROCEDURE — 87086 URINE CULTURE/COLONY COUNT: CPT | Performed by: OBSTETRICS & GYNECOLOGY

## 2021-04-07 PROCEDURE — G0378 HOSPITAL OBSERVATION PER HR: HCPCS

## 2021-04-07 RX ORDER — DOCUSATE SODIUM 100 MG/1
100 CAPSULE, LIQUID FILLED ORAL 2 TIMES DAILY
COMMUNITY
End: 2021-12-17

## 2021-04-07 RX ORDER — LEVOTHYROXINE SODIUM 0.07 MG/1
75 TABLET ORAL DAILY
COMMUNITY
End: 2022-04-27

## 2021-04-07 RX ORDER — PRENATAL VIT NO.126/IRON/FOLIC 28MG-0.8MG
TABLET ORAL DAILY
COMMUNITY
End: 2021-12-17

## 2021-04-07 RX ORDER — MAGNESIUM CHLORIDE 64 MG
TABLET, DELAYED RELEASE (ENTERIC COATED) ORAL DAILY
COMMUNITY
End: 2021-12-17

## 2021-04-08 ENCOUNTER — TELEPHONE (OUTPATIENT)
Dept: FAMILY MEDICINE CLINIC | Facility: CLINIC | Age: 31
End: 2021-04-08

## 2021-04-08 PROBLEM — Z34.90 PREGNANCY: Status: ACTIVE | Noted: 2021-04-08

## 2021-04-08 LAB — BACTERIA SPEC AEROBE CULT: NO GROWTH

## 2021-04-08 NOTE — TELEPHONE ENCOUNTER
Patient recently discharged from hospital, no pcp listed.  I spoke with her and she said she did not want to establish care with a pcp at this time, her OB is taking care of all of her meds.

## 2021-05-28 ENCOUNTER — ANESTHESIA (OUTPATIENT)
Dept: LABOR AND DELIVERY | Facility: HOSPITAL | Age: 31
End: 2021-05-28

## 2021-05-28 ENCOUNTER — ANESTHESIA EVENT (OUTPATIENT)
Dept: LABOR AND DELIVERY | Facility: HOSPITAL | Age: 31
End: 2021-05-28

## 2021-05-28 ENCOUNTER — HOSPITAL ENCOUNTER (INPATIENT)
Facility: HOSPITAL | Age: 31
LOS: 2 days | Discharge: HOME | End: 2021-05-30
Attending: OBSTETRICS & GYNECOLOGY | Admitting: OBSTETRICS & GYNECOLOGY

## 2021-05-28 PROBLEM — Z37.9 NORMAL LABOR: Status: ACTIVE | Noted: 2021-05-28

## 2021-05-28 LAB
ABO GROUP BLD: NORMAL
BLD GP AB SCN SERPL QL: NEGATIVE
DEPRECATED RDW RBC AUTO: 43.4 FL (ref 37–54)
ERYTHROCYTE [DISTWIDTH] IN BLOOD BY AUTOMATED COUNT: 13.6 % (ref 12.3–15.4)
HCT VFR BLD AUTO: 30.7 % (ref 34–46.6)
HGB BLD-MCNC: 10.2 G/DL (ref 12–15.9)
MCH RBC QN AUTO: 29.1 PG (ref 26.6–33)
MCHC RBC AUTO-ENTMCNC: 33.2 G/DL (ref 31.5–35.7)
MCV RBC AUTO: 87.5 FL (ref 79–97)
PLATELET # BLD AUTO: 368 10*3/MM3 (ref 140–450)
PMV BLD AUTO: 9.9 FL (ref 6–12)
RBC # BLD AUTO: 3.51 10*6/MM3 (ref 3.77–5.28)
RH BLD: POSITIVE
T&S EXPIRATION DATE: NORMAL
WBC # BLD AUTO: 10.83 10*3/MM3 (ref 3.4–10.8)

## 2021-05-28 PROCEDURE — 85027 COMPLETE CBC AUTOMATED: CPT | Performed by: OBSTETRICS & GYNECOLOGY

## 2021-05-28 PROCEDURE — C1755 CATHETER, INTRASPINAL: HCPCS | Performed by: NURSE ANESTHETIST, CERTIFIED REGISTERED

## 2021-05-28 PROCEDURE — 4A1HX4Z MONITORING OF PRODUCTS OF CONCEPTION, CARDIAC ELECTRICAL ACTIVITY, EXTERNAL APPROACH: ICD-10-PCS | Performed by: OBSTETRICS & GYNECOLOGY

## 2021-05-28 PROCEDURE — 86850 RBC ANTIBODY SCREEN: CPT | Performed by: OBSTETRICS & GYNECOLOGY

## 2021-05-28 PROCEDURE — 86901 BLOOD TYPING SEROLOGIC RH(D): CPT | Performed by: OBSTETRICS & GYNECOLOGY

## 2021-05-28 PROCEDURE — 25010000002 ROPIVACAINE PER 1 MG: Performed by: NURSE ANESTHETIST, CERTIFIED REGISTERED

## 2021-05-28 PROCEDURE — 25010000002 ONDANSETRON PER 1 MG: Performed by: OBSTETRICS & GYNECOLOGY

## 2021-05-28 PROCEDURE — 59409 OBSTETRICAL CARE: CPT | Performed by: OBSTETRICS & GYNECOLOGY

## 2021-05-28 PROCEDURE — 86900 BLOOD TYPING SEROLOGIC ABO: CPT | Performed by: OBSTETRICS & GYNECOLOGY

## 2021-05-28 PROCEDURE — 51702 INSERT TEMP BLADDER CATH: CPT

## 2021-05-28 RX ORDER — OXYTOCIN/0.9 % SODIUM CHLORIDE 30/500 ML
4 PLASTIC BAG, INJECTION (ML) INTRAVENOUS
Status: DISCONTINUED | OUTPATIENT
Start: 2021-05-28 | End: 2021-05-28

## 2021-05-28 RX ORDER — ONDANSETRON 2 MG/ML
4 INJECTION INTRAMUSCULAR; INTRAVENOUS EVERY 6 HOURS PRN
Status: DISCONTINUED | OUTPATIENT
Start: 2021-05-28 | End: 2021-05-29 | Stop reason: HOSPADM

## 2021-05-28 RX ORDER — ACETAMINOPHEN 325 MG/1
650 TABLET ORAL EVERY 4 HOURS PRN
Status: DISCONTINUED | OUTPATIENT
Start: 2021-05-28 | End: 2021-05-29 | Stop reason: HOSPADM

## 2021-05-28 RX ORDER — BUTORPHANOL TARTRATE 1 MG/ML
1 INJECTION, SOLUTION INTRAMUSCULAR; INTRAVENOUS
Status: CANCELLED | OUTPATIENT
Start: 2021-05-28

## 2021-05-28 RX ORDER — OXYTOCIN/0.9 % SODIUM CHLORIDE 30/500 ML
2-20 PLASTIC BAG, INJECTION (ML) INTRAVENOUS
Status: DISCONTINUED | OUTPATIENT
Start: 2021-05-28 | End: 2021-05-29

## 2021-05-28 RX ORDER — ONDANSETRON 2 MG/ML
4 INJECTION INTRAMUSCULAR; INTRAVENOUS EVERY 6 HOURS PRN
Status: CANCELLED | OUTPATIENT
Start: 2021-05-28

## 2021-05-28 RX ORDER — ONDANSETRON 4 MG/1
4 TABLET, FILM COATED ORAL EVERY 6 HOURS PRN
Status: CANCELLED | OUTPATIENT
Start: 2021-05-28

## 2021-05-28 RX ORDER — ROPIVACAINE HYDROCHLORIDE 2 MG/ML
INJECTION, SOLUTION EPIDURAL; INFILTRATION; PERINEURAL AS NEEDED
Status: DISCONTINUED | OUTPATIENT
Start: 2021-05-28 | End: 2021-05-29 | Stop reason: SURG

## 2021-05-28 RX ORDER — IBUPROFEN 600 MG/1
600 TABLET ORAL EVERY 8 HOURS PRN
Status: DISCONTINUED | OUTPATIENT
Start: 2021-05-28 | End: 2021-05-30 | Stop reason: HOSPADM

## 2021-05-28 RX ORDER — HYDROCODONE BITARTRATE AND ACETAMINOPHEN 5; 325 MG/1; MG/1
1 TABLET ORAL EVERY 4 HOURS PRN
Status: DISCONTINUED | OUTPATIENT
Start: 2021-05-28 | End: 2021-05-30 | Stop reason: HOSPADM

## 2021-05-28 RX ORDER — OXYTOCIN/0.9 % SODIUM CHLORIDE 30/500 ML
2-20 PLASTIC BAG, INJECTION (ML) INTRAVENOUS
Status: DISCONTINUED | OUTPATIENT
Start: 2021-05-28 | End: 2021-05-28

## 2021-05-28 RX ORDER — TERBUTALINE SULFATE 1 MG/ML
0.25 INJECTION, SOLUTION SUBCUTANEOUS AS NEEDED
Status: DISCONTINUED | OUTPATIENT
Start: 2021-05-28 | End: 2021-05-29 | Stop reason: HOSPADM

## 2021-05-28 RX ORDER — IBUPROFEN 800 MG/1
800 TABLET ORAL EVERY 8 HOURS PRN
Status: CANCELLED | OUTPATIENT
Start: 2021-05-28

## 2021-05-28 RX ORDER — SODIUM CHLORIDE 0.9 % (FLUSH) 0.9 %
1-10 SYRINGE (ML) INJECTION AS NEEDED
Status: DISCONTINUED | OUTPATIENT
Start: 2021-05-28 | End: 2021-05-29 | Stop reason: HOSPADM

## 2021-05-28 RX ORDER — OXYTOCIN/0.9 % SODIUM CHLORIDE 30/500 ML
999 PLASTIC BAG, INJECTION (ML) INTRAVENOUS ONCE
Status: DISCONTINUED | OUTPATIENT
Start: 2021-05-28 | End: 2021-05-29 | Stop reason: HOSPADM

## 2021-05-28 RX ORDER — OXYTOCIN/0.9 % SODIUM CHLORIDE 30/500 ML
125 PLASTIC BAG, INJECTION (ML) INTRAVENOUS CONTINUOUS PRN
Status: COMPLETED | OUTPATIENT
Start: 2021-05-28 | End: 2021-05-28

## 2021-05-28 RX ORDER — CARBOPROST TROMETHAMINE 250 UG/ML
250 INJECTION, SOLUTION INTRAMUSCULAR AS NEEDED
Status: DISCONTINUED | OUTPATIENT
Start: 2021-05-28 | End: 2021-05-29 | Stop reason: HOSPADM

## 2021-05-28 RX ORDER — BUTORPHANOL TARTRATE 1 MG/ML
1 INJECTION, SOLUTION INTRAMUSCULAR; INTRAVENOUS
Status: DISCONTINUED | OUTPATIENT
Start: 2021-05-28 | End: 2021-05-29 | Stop reason: HOSPADM

## 2021-05-28 RX ORDER — SODIUM CHLORIDE, SODIUM LACTATE, POTASSIUM CHLORIDE, CALCIUM CHLORIDE 600; 310; 30; 20 MG/100ML; MG/100ML; MG/100ML; MG/100ML
125 INJECTION, SOLUTION INTRAVENOUS CONTINUOUS
Status: DISCONTINUED | OUTPATIENT
Start: 2021-05-28 | End: 2021-05-29

## 2021-05-28 RX ORDER — OXYTOCIN/0.9 % SODIUM CHLORIDE 30/500 ML
250 PLASTIC BAG, INJECTION (ML) INTRAVENOUS CONTINUOUS
Status: DISPENSED | OUTPATIENT
Start: 2021-05-28 | End: 2021-05-28

## 2021-05-28 RX ORDER — LIDOCAINE HYDROCHLORIDE 10 MG/ML
5 INJECTION, SOLUTION EPIDURAL; INFILTRATION; INTRACAUDAL; PERINEURAL AS NEEDED
Status: DISCONTINUED | OUTPATIENT
Start: 2021-05-28 | End: 2021-05-29 | Stop reason: HOSPADM

## 2021-05-28 RX ORDER — LIDOCAINE HYDROCHLORIDE AND EPINEPHRINE 15; 5 MG/ML; UG/ML
INJECTION, SOLUTION EPIDURAL
Status: COMPLETED | OUTPATIENT
Start: 2021-05-28 | End: 2021-05-28

## 2021-05-28 RX ORDER — MISOPROSTOL 200 UG/1
800 TABLET ORAL AS NEEDED
Status: DISCONTINUED | OUTPATIENT
Start: 2021-05-28 | End: 2021-05-29 | Stop reason: HOSPADM

## 2021-05-28 RX ORDER — SODIUM CHLORIDE 0.9 % (FLUSH) 0.9 %
3 SYRINGE (ML) INJECTION EVERY 12 HOURS SCHEDULED
Status: DISCONTINUED | OUTPATIENT
Start: 2021-05-28 | End: 2021-05-29 | Stop reason: HOSPADM

## 2021-05-28 RX ORDER — LIDOCAINE HYDROCHLORIDE 20 MG/ML
20 INJECTION, SOLUTION EPIDURAL; INFILTRATION; INTRACAUDAL; PERINEURAL ONCE AS NEEDED
Status: DISCONTINUED | OUTPATIENT
Start: 2021-05-28 | End: 2021-05-29

## 2021-05-28 RX ORDER — EPHEDRINE SULFATE 50 MG/ML
10 INJECTION, SOLUTION INTRAVENOUS
Status: DISCONTINUED | OUTPATIENT
Start: 2021-05-28 | End: 2021-05-29 | Stop reason: HOSPADM

## 2021-05-28 RX ORDER — ONDANSETRON 4 MG/1
4 TABLET, FILM COATED ORAL EVERY 6 HOURS PRN
Status: DISCONTINUED | OUTPATIENT
Start: 2021-05-28 | End: 2021-05-29 | Stop reason: HOSPADM

## 2021-05-28 RX ORDER — METHYLERGONOVINE MALEATE 0.2 MG/ML
200 INJECTION INTRAVENOUS ONCE AS NEEDED
Status: DISCONTINUED | OUTPATIENT
Start: 2021-05-28 | End: 2021-05-29 | Stop reason: HOSPADM

## 2021-05-28 RX ADMIN — ONDANSETRON HYDROCHLORIDE 4 MG: 2 SOLUTION INTRAMUSCULAR; INTRAVENOUS at 23:20

## 2021-05-28 RX ADMIN — SODIUM CHLORIDE, POTASSIUM CHLORIDE, SODIUM LACTATE AND CALCIUM CHLORIDE 1000 ML: 600; 310; 30; 20 INJECTION, SOLUTION INTRAVENOUS at 16:00

## 2021-05-28 RX ADMIN — SODIUM CHLORIDE, POTASSIUM CHLORIDE, SODIUM LACTATE AND CALCIUM CHLORIDE 125 ML/HR: 600; 310; 30; 20 INJECTION, SOLUTION INTRAVENOUS at 09:16

## 2021-05-28 RX ADMIN — SODIUM CHLORIDE, POTASSIUM CHLORIDE, SODIUM LACTATE AND CALCIUM CHLORIDE 125 ML/HR: 600; 310; 30; 20 INJECTION, SOLUTION INTRAVENOUS at 17:11

## 2021-05-28 RX ADMIN — HYDROCODONE BITARTRATE AND ACETAMINOPHEN 1 TABLET: 5; 325 TABLET ORAL at 22:52

## 2021-05-28 RX ADMIN — OXYTOCIN-SODIUM CHLORIDE 0.9% IV SOLN 30 UNIT/500ML 125 ML/HR: 30-0.9/5 SOLUTION at 23:40

## 2021-05-28 RX ADMIN — IBUPROFEN 600 MG: 600 TABLET ORAL at 22:52

## 2021-05-28 RX ADMIN — LIDOCAINE HYDROCHLORIDE AND EPINEPHRINE 3 ML: 15; 5 INJECTION, SOLUTION EPIDURAL at 16:19

## 2021-05-28 RX ADMIN — ROPIVACAINE HYDROCHLORIDE 6 ML/HR: 2 INJECTION, SOLUTION EPIDURAL; INFILTRATION at 16:27

## 2021-05-28 RX ADMIN — ROPIVACAINE HYDROCHLORIDE 8 ML: 2 INJECTION, SOLUTION EPIDURAL; INFILTRATION at 16:24

## 2021-05-28 RX ADMIN — OXYTOCIN-SODIUM CHLORIDE 0.9% IV SOLN 30 UNIT/500ML 2 MILLI-UNITS/MIN: 30-0.9/5 SOLUTION at 15:44

## 2021-05-28 NOTE — ANESTHESIA PREPROCEDURE EVALUATION
Anesthesia Evaluation     Patient summary reviewed and Nursing notes reviewed   NPO Solid Status: > 8 hours  NPO Liquid Status: > 2 hours           Airway   Mallampati: III  TM distance: >3 FB  Neck ROM: full  No difficulty expected  Dental - normal exam     Pulmonary - negative pulmonary ROS   Cardiovascular - negative cardio ROS        Neuro/Psych- negative ROS  GI/Hepatic/Renal/Endo - negative ROS     Musculoskeletal (-) negative ROS    Abdominal    Substance History - negative use     OB/GYN    (+) Pregnant,         Other                      Anesthesia Plan    ASA 2     epidural       Anesthetic plan, all risks, benefits, and alternatives have been provided, discussed and informed consent has been obtained with: patient.

## 2021-05-28 NOTE — ANESTHESIA PROCEDURE NOTES
Labor Epidural      Patient reassessed immediately prior to procedure    Patient location during procedure: OB  Performed By  CRNA: Manuel Ewing CRNA  Preanesthetic Checklist  Completed: patient identified, IV checked, site marked, risks and benefits discussed, surgical consent, monitors and equipment checked, pre-op evaluation and timeout performed  Prep:  Pt Position:sitting  Sterile Tech:cap, gloves, mask and sterile barrier  Prep:chlorhexidine gluconate and isopropyl alcohol  Monitoring:blood pressure monitoring  Epidural Block Procedure:  Approach:midline  Guidance:landmark technique  Location:L4-L5  Needle Type:Tuohy  Needle Gauge:18 G  Loss of Resistance Medium: saline  Loss of Resistance: 6cm  Cath Depth at skin:14 cm  Paresthesia: none  Aspiration:negative  Test Dose:negative  Test dose medication: lidocaine 1.5%-EPINEPHrine 1:200,000 (XYLOCAINE W/EPI) injection, 3 mL  Med administered at 5/28/2021 4:19 PM  Number of Attempts: 1  Post Assessment:  Dressing:occlusive dressing applied and secured with tape  Pt Tolerance:patient tolerated the procedure well with no apparent complications  Complications:no

## 2021-05-29 LAB
HCT VFR BLD AUTO: 32.5 % (ref 34–46.6)
HGB BLD-MCNC: 10.4 G/DL (ref 12–15.9)

## 2021-05-29 PROCEDURE — 85018 HEMOGLOBIN: CPT | Performed by: OBSTETRICS & GYNECOLOGY

## 2021-05-29 PROCEDURE — 85014 HEMATOCRIT: CPT | Performed by: OBSTETRICS & GYNECOLOGY

## 2021-05-29 RX ORDER — PRENATAL VIT/IRON FUM/FOLIC AC 27MG-0.8MG
1 TABLET ORAL DAILY
Status: DISCONTINUED | OUTPATIENT
Start: 2021-05-29 | End: 2021-05-30 | Stop reason: HOSPADM

## 2021-05-29 RX ORDER — BISACODYL 10 MG
10 SUPPOSITORY, RECTAL RECTAL DAILY PRN
Status: DISCONTINUED | OUTPATIENT
Start: 2021-05-29 | End: 2021-05-30 | Stop reason: HOSPADM

## 2021-05-29 RX ORDER — NALOXONE HCL 0.4 MG/ML
0.4 VIAL (ML) INJECTION
Status: DISCONTINUED | OUTPATIENT
Start: 2021-05-29 | End: 2021-05-30 | Stop reason: HOSPADM

## 2021-05-29 RX ORDER — LEVOTHYROXINE SODIUM 0.07 MG/1
75 TABLET ORAL
Status: DISCONTINUED | OUTPATIENT
Start: 2021-05-29 | End: 2021-05-30 | Stop reason: HOSPADM

## 2021-05-29 RX ORDER — CALCIUM CARBONATE 200(500)MG
2 TABLET,CHEWABLE ORAL 3 TIMES DAILY PRN
Status: DISCONTINUED | OUTPATIENT
Start: 2021-05-29 | End: 2021-05-30 | Stop reason: HOSPADM

## 2021-05-29 RX ORDER — HYDROCORTISONE 25 MG/G
1 CREAM TOPICAL AS NEEDED
Status: DISCONTINUED | OUTPATIENT
Start: 2021-05-29 | End: 2021-05-30 | Stop reason: HOSPADM

## 2021-05-29 RX ORDER — MORPHINE SULFATE 2 MG/ML
1 INJECTION, SOLUTION INTRAMUSCULAR; INTRAVENOUS EVERY 4 HOURS PRN
Status: DISCONTINUED | OUTPATIENT
Start: 2021-05-29 | End: 2021-05-30 | Stop reason: HOSPADM

## 2021-05-29 RX ORDER — ONDANSETRON 2 MG/ML
4 INJECTION INTRAMUSCULAR; INTRAVENOUS EVERY 6 HOURS PRN
Status: DISCONTINUED | OUTPATIENT
Start: 2021-05-29 | End: 2021-05-30 | Stop reason: HOSPADM

## 2021-05-29 RX ORDER — PROMETHAZINE HYDROCHLORIDE 12.5 MG/1
12.5 SUPPOSITORY RECTAL EVERY 6 HOURS PRN
Status: DISCONTINUED | OUTPATIENT
Start: 2021-05-29 | End: 2021-05-30 | Stop reason: HOSPADM

## 2021-05-29 RX ORDER — DOCUSATE SODIUM 100 MG/1
100 CAPSULE, LIQUID FILLED ORAL 2 TIMES DAILY
Status: DISCONTINUED | OUTPATIENT
Start: 2021-05-29 | End: 2021-05-30 | Stop reason: HOSPADM

## 2021-05-29 RX ORDER — SODIUM CHLORIDE 0.9 % (FLUSH) 0.9 %
1-10 SYRINGE (ML) INJECTION AS NEEDED
Status: DISCONTINUED | OUTPATIENT
Start: 2021-05-29 | End: 2021-05-30 | Stop reason: HOSPADM

## 2021-05-29 RX ORDER — PROMETHAZINE HYDROCHLORIDE 25 MG/1
25 TABLET ORAL EVERY 6 HOURS PRN
Status: DISCONTINUED | OUTPATIENT
Start: 2021-05-29 | End: 2021-05-30 | Stop reason: HOSPADM

## 2021-05-29 RX ORDER — ONDANSETRON 4 MG/1
4 TABLET, FILM COATED ORAL EVERY 8 HOURS PRN
Status: DISCONTINUED | OUTPATIENT
Start: 2021-05-29 | End: 2021-05-30 | Stop reason: HOSPADM

## 2021-05-29 RX ORDER — METHYLERGONOVINE MALEATE 0.2 MG/ML
200 INJECTION INTRAVENOUS ONCE AS NEEDED
Status: DISCONTINUED | OUTPATIENT
Start: 2021-05-29 | End: 2021-05-30 | Stop reason: HOSPADM

## 2021-05-29 RX ORDER — MISOPROSTOL 200 UG/1
600 TABLET ORAL ONCE AS NEEDED
Status: DISCONTINUED | OUTPATIENT
Start: 2021-05-29 | End: 2021-05-30 | Stop reason: HOSPADM

## 2021-05-29 RX ORDER — HYDROXYZINE HYDROCHLORIDE 25 MG/1
50 TABLET, FILM COATED ORAL NIGHTLY PRN
Status: DISCONTINUED | OUTPATIENT
Start: 2021-05-29 | End: 2021-05-30 | Stop reason: HOSPADM

## 2021-05-29 RX ADMIN — IBUPROFEN 600 MG: 600 TABLET ORAL at 06:17

## 2021-05-29 RX ADMIN — DOCUSATE SODIUM 100 MG: 100 CAPSULE ORAL at 19:44

## 2021-05-29 RX ADMIN — DOCUSATE SODIUM 100 MG: 100 CAPSULE ORAL at 08:22

## 2021-05-29 RX ADMIN — PRAMOXINE HYDROCHLORIDE HYDROCORTISONE ACETATE 1 APPLICATOR: 100; 100 AEROSOL, FOAM TOPICAL at 06:17

## 2021-05-29 RX ADMIN — LEVOTHYROXINE SODIUM 75 MCG: 75 TABLET ORAL at 06:17

## 2021-05-29 RX ADMIN — IBUPROFEN 600 MG: 600 TABLET ORAL at 17:06

## 2021-05-29 RX ADMIN — HYDROCODONE BITARTRATE AND ACETAMINOPHEN 1 TABLET: 5; 325 TABLET ORAL at 22:25

## 2021-05-29 RX ADMIN — HYDROCODONE BITARTRATE AND ACETAMINOPHEN 1 TABLET: 5; 325 TABLET ORAL at 14:05

## 2021-05-29 RX ADMIN — PRENATAL VIT W/ FE FUMARATE-FA TAB 27-0.8 MG 1 TABLET: 27-0.8 TAB at 08:22

## 2021-05-29 RX ADMIN — BENZOCAINE AND LEVOMENTHOL: 200; 5 SPRAY TOPICAL at 06:16

## 2021-05-30 VITALS
HEIGHT: 63 IN | SYSTOLIC BLOOD PRESSURE: 115 MMHG | OXYGEN SATURATION: 98 % | HEART RATE: 72 BPM | TEMPERATURE: 97.4 F | DIASTOLIC BLOOD PRESSURE: 71 MMHG | WEIGHT: 144 LBS | BODY MASS INDEX: 25.52 KG/M2 | RESPIRATION RATE: 16 BRPM

## 2021-05-30 PROCEDURE — 99238 HOSP IP/OBS DSCHRG MGMT 30/<: CPT | Performed by: OBSTETRICS & GYNECOLOGY

## 2021-05-30 PROCEDURE — 25010000002 TDAP 5-2.5-18.5 LF-MCG/0.5 SUSPENSION: Performed by: OBSTETRICS & GYNECOLOGY

## 2021-05-30 PROCEDURE — 90715 TDAP VACCINE 7 YRS/> IM: CPT | Performed by: OBSTETRICS & GYNECOLOGY

## 2021-05-30 PROCEDURE — 90471 IMMUNIZATION ADMIN: CPT | Performed by: OBSTETRICS & GYNECOLOGY

## 2021-05-30 RX ADMIN — IBUPROFEN 600 MG: 600 TABLET ORAL at 13:21

## 2021-05-30 RX ADMIN — TETANUS TOXOID, REDUCED DIPHTHERIA TOXOID AND ACELLULAR PERTUSSIS VACCINE, ADSORBED 0.5 ML: 5; 2.5; 8; 8; 2.5 SUSPENSION INTRAMUSCULAR at 10:29

## 2021-05-30 RX ADMIN — IBUPROFEN 600 MG: 600 TABLET ORAL at 05:12

## 2021-05-30 RX ADMIN — HYDROCODONE BITARTRATE AND ACETAMINOPHEN 1 TABLET: 5; 325 TABLET ORAL at 03:57

## 2021-05-30 RX ADMIN — DOCUSATE SODIUM 100 MG: 100 CAPSULE ORAL at 08:29

## 2021-05-30 RX ADMIN — HYDROCODONE BITARTRATE AND ACETAMINOPHEN 1 TABLET: 5; 325 TABLET ORAL at 13:22

## 2021-05-30 RX ADMIN — PRENATAL VIT W/ FE FUMARATE-FA TAB 27-0.8 MG 1 TABLET: 27-0.8 TAB at 08:29

## 2021-05-30 RX ADMIN — LEVOTHYROXINE SODIUM 75 MCG: 75 TABLET ORAL at 07:18

## 2021-05-30 NOTE — ANESTHESIA POSTPROCEDURE EVALUATION
"Patient: Juanita Cedillo    Procedure Summary     Date: 05/28/21 Room / Location:     Anesthesia Start: 1606 Anesthesia Stop: 2229    Procedure: LABOR ANALGESIA Diagnosis:     Scheduled Providers:  Provider: Manuel Ewing CRNA    Anesthesia Type: epidural ASA Status: 2          Anesthesia Type: epidural    Vitals  Vitals Value Taken Time   /71 05/29/21 1940   Temp 98.1 °F (36.7 °C) 05/29/21 1940   Pulse 85 05/29/21 1940   Resp 18 05/29/21 1940   SpO2 97 % 05/29/21 1940           Post Anesthesia Care and Evaluation    Patient location during evaluation: bedside  Anesthetic complications: No anesthetic complications  Post Neuraxial Block status: Motor and sensory function returned to baseline and No signs or symptoms of PDPH  Comments: Blood pressure 109/71, pulse 85, temperature 98.1 °F (36.7 °C), temperature source Temporal, resp. rate 18, height 160 cm (63\"), weight 65.3 kg (144 lb), SpO2 97 %, not currently breastfeeding.        "

## 2021-12-14 ENCOUNTER — TRANSCRIBE ORDERS (OUTPATIENT)
Dept: LAB | Facility: HOSPITAL | Age: 31
End: 2021-12-14

## 2021-12-14 DIAGNOSIS — Z11.59 SCREENING FOR VIRAL DISEASE: Primary | ICD-10-CM

## 2021-12-17 ENCOUNTER — LAB (OUTPATIENT)
Dept: LAB | Facility: HOSPITAL | Age: 31
End: 2021-12-17

## 2021-12-17 ENCOUNTER — PRE-ADMISSION TESTING (OUTPATIENT)
Dept: PREADMISSION TESTING | Facility: HOSPITAL | Age: 31
End: 2021-12-17

## 2021-12-17 VITALS
DIASTOLIC BLOOD PRESSURE: 84 MMHG | SYSTOLIC BLOOD PRESSURE: 107 MMHG | OXYGEN SATURATION: 98 % | HEIGHT: 64 IN | BODY MASS INDEX: 18.86 KG/M2 | WEIGHT: 110.45 LBS | HEART RATE: 96 BPM | RESPIRATION RATE: 20 BRPM

## 2021-12-17 LAB
ALBUMIN SERPL-MCNC: 4.5 G/DL (ref 3.5–5.2)
ALBUMIN/GLOB SERPL: 1.5 G/DL
ALP SERPL-CCNC: 83 U/L (ref 39–117)
ALT SERPL W P-5'-P-CCNC: 11 U/L (ref 1–33)
ANION GAP SERPL CALCULATED.3IONS-SCNC: 7 MMOL/L (ref 5–15)
AST SERPL-CCNC: 16 U/L (ref 1–32)
BASOPHILS # BLD AUTO: 0.04 10*3/MM3 (ref 0–0.2)
BASOPHILS NFR BLD AUTO: 0.6 % (ref 0–1.5)
BILIRUB SERPL-MCNC: 0.3 MG/DL (ref 0–1.2)
BUN SERPL-MCNC: 7 MG/DL (ref 6–20)
BUN/CREAT SERPL: 10.1 (ref 7–25)
CALCIUM SPEC-SCNC: 9.5 MG/DL (ref 8.6–10.5)
CHLORIDE SERPL-SCNC: 106 MMOL/L (ref 98–107)
CO2 SERPL-SCNC: 26 MMOL/L (ref 22–29)
CREAT SERPL-MCNC: 0.69 MG/DL (ref 0.57–1)
DEPRECATED RDW RBC AUTO: 44.8 FL (ref 37–54)
EOSINOPHIL # BLD AUTO: 0.33 10*3/MM3 (ref 0–0.4)
EOSINOPHIL NFR BLD AUTO: 4.6 % (ref 0.3–6.2)
ERYTHROCYTE [DISTWIDTH] IN BLOOD BY AUTOMATED COUNT: 13.4 % (ref 12.3–15.4)
GFR SERPL CREATININE-BSD FRML MDRD: 101 ML/MIN/1.73
GLOBULIN UR ELPH-MCNC: 3.1 GM/DL
GLUCOSE SERPL-MCNC: 86 MG/DL (ref 65–99)
HCT VFR BLD AUTO: 40.6 % (ref 34–46.6)
HGB BLD-MCNC: 12.6 G/DL (ref 12–15.9)
IMM GRANULOCYTES # BLD AUTO: 0.01 10*3/MM3 (ref 0–0.05)
IMM GRANULOCYTES NFR BLD AUTO: 0.1 % (ref 0–0.5)
LYMPHOCYTES # BLD AUTO: 2.11 10*3/MM3 (ref 0.7–3.1)
LYMPHOCYTES NFR BLD AUTO: 29.1 % (ref 19.6–45.3)
MCH RBC QN AUTO: 28.2 PG (ref 26.6–33)
MCHC RBC AUTO-ENTMCNC: 31 G/DL (ref 31.5–35.7)
MCV RBC AUTO: 90.8 FL (ref 79–97)
MONOCYTES # BLD AUTO: 0.33 10*3/MM3 (ref 0.1–0.9)
MONOCYTES NFR BLD AUTO: 4.6 % (ref 5–12)
NEUTROPHILS NFR BLD AUTO: 4.43 10*3/MM3 (ref 1.7–7)
NEUTROPHILS NFR BLD AUTO: 61 % (ref 42.7–76)
NRBC BLD AUTO-RTO: 0 /100 WBC (ref 0–0.2)
PLATELET # BLD AUTO: 373 10*3/MM3 (ref 140–450)
PMV BLD AUTO: 10.3 FL (ref 6–12)
POTASSIUM SERPL-SCNC: 4 MMOL/L (ref 3.5–5.2)
PROT SERPL-MCNC: 7.6 G/DL (ref 6–8.5)
RBC # BLD AUTO: 4.47 10*6/MM3 (ref 3.77–5.28)
SARS-COV-2 ORF1AB RESP QL NAA+PROBE: NOT DETECTED
SODIUM SERPL-SCNC: 139 MMOL/L (ref 136–145)
WBC NRBC COR # BLD: 7.25 10*3/MM3 (ref 3.4–10.8)

## 2021-12-17 PROCEDURE — 36415 COLL VENOUS BLD VENIPUNCTURE: CPT

## 2021-12-17 PROCEDURE — 80053 COMPREHEN METABOLIC PANEL: CPT

## 2021-12-17 PROCEDURE — 85025 COMPLETE CBC W/AUTO DIFF WBC: CPT

## 2021-12-17 PROCEDURE — U0004 COV-19 TEST NON-CDC HGH THRU: HCPCS | Performed by: OBSTETRICS & GYNECOLOGY

## 2021-12-17 PROCEDURE — C9803 HOPD COVID-19 SPEC COLLECT: HCPCS | Performed by: OBSTETRICS & GYNECOLOGY

## 2021-12-17 RX ORDER — BUSPIRONE HYDROCHLORIDE 10 MG/1
10 TABLET ORAL NIGHTLY
COMMUNITY
End: 2021-12-20 | Stop reason: HOSPADM

## 2021-12-17 RX ORDER — BUSPIRONE HYDROCHLORIDE 5 MG/1
5 TABLET ORAL DAILY
COMMUNITY
End: 2021-12-20 | Stop reason: HOSPADM

## 2021-12-17 NOTE — DISCHARGE INSTRUCTIONS
DAY OF SURGERY INSTRUCTIONS          ARRIVAL TIME: AS DIRECTED BY OFFICE    YOU MAY TAKE THE FOLLOWING MEDICATION(S) THE MORNING OF SURGERY WITH A SIP OF WATER: Buspar      ALL OTHER HOME MEDICATION CHECK WITH YOUR PHYSICIAN      DO NOT TAKE ANY ERECTILE DYSFUNCTION MEDICATIONS (EX: CIALIS, VIAGRA) 24 HOURS PRIOR TO SURGERY                      MANAGING PAIN AFTER SURGERY    We know you are probably wondering what your pain will be like after surgery.  Following surgery it is unrealistic to expect you will not have pain.   Pain is how our bodies let us know that something is wrong or cautions us to be careful.  That said, our goal is to make your pain tolerable.    Methods we may use to treat your pain include (oral or IV medications, PCAs, epidurals, nerve blocks, etc.)   While some procedures require IV pain medications for a short time after surgery, transitioning to pain medications by mouth allows for better management of pain.   Your nurse will encourage you to take oral pain medications whenever possible.  IV medications work almost immediately, but only last a short while.  Taking medications by mouth allows for a more constant level of medication in your blood stream for a longer period of time.      Once your pain is out of control it is harder to get back under control.  It is important you are aware when your next dose of pain medication is due.  If you are admitted, your nurse may write the time of your next dose on the white board in your room to help you remember.      We are interested in your pain and encourage you to inform us about aggravating factors during your visit.   Many times a simple repositioning every few hours can make a big difference.    If your physician says it is okay, do not let your pain prevent you from getting out of bed. Be sure to call your nurse for assistance prior to getting up so you do not fall.      Before surgery, please decide your tolerable pain goal.  These faces  help describe the pain ratings we use on a 0-10 scale.   Be prepared to tell us your goal and whether or not you take pain or anxiety medications at home.          BEFORE YOU COME TO THE HOSPITAL  (Pre-op instructions)  • Do not eat, drink, smoke or chew gum after midnight the night before surgery.  This also includes no mints.  • Morning of surgery take only the medicines you have been instructed with a sip of water unless otherwise instructed  by your physician.  • Do not shave, wear makeup or dark nail polish.  • Remove all jewelry including rings.  • Leave anything you consider valuable at home.  • Leave your suitcase in the car until after your surgery.  • Bring the following with you if applicable:  o Picture ID and insurance, Medicare or Medicaid cards  o Co-pay/deductible required by insurance (cash, check, credit card)  o Copy of advance directive, living will or power-of- documents if not brought to pre-work  o CPAP or BIPAP mask and tubing  o Relaxation aids ( book, magazine), etc.  o Hearing aids                        ON THE DAY OF SURGERY  · On the day of surgery check in at registration located at the main entrance of the hospital. Only one family member or friend are allowed per patient.  ? You will be registered and given a beeper with instructions where to wait in the main lobby.  ? When your beeper lights up and vibrates a member of the Outpatient Surgery staff will meet you at the double doors under the stair steps and escort you to your preoperative room.   · You may have cloth compression devices placed on your legs. These help to prevent blood clots and reduce swelling in your legs.  · An IV may be inserted into one of your veins.  · In the operating room, you may be given one or more of the following:  ? A medicine to help you relax (sedative).  ? A medicine to numb the area (local anesthetic).  ? A medicine to make you fall asleep (general anesthetic).  ? A medicine that is injected  "into an area of your body to numb everything below the injection site (regional anesthetic).  · Your surgical site will be marked or identified.  · You may be given an antibiotic through your IV to help prevent infection.  Contact a health care provider if you:  · Develop a fever of more than 100.4°F (38°C) or other feelings of illness during the 48 hours before your surgery.  · Have symptoms that get worse.  Have questions or concerns about your surgery    General Anesthesia/Surgery, Adult  General anesthesia is the use of medicines to make a person \"go to sleep\" (unconscious) for a medical procedure. General anesthesia must be used for certain procedures, and is often recommended for procedures that:  · Last a long time.  · Require you to be still or in an unusual position.  · Are major and can cause blood loss.  The medicines used for general anesthesia are called general anesthetics. As well as making you unconscious for a certain amount of time, these medicines:  · Prevent pain.  · Control your blood pressure.  · Relax your muscles.  Tell a health care provider about:  · Any allergies you have.  · All medicines you are taking, including vitamins, herbs, eye drops, creams, and over-the-counter medicines.  · Any problems you or family members have had with anesthetic medicines.  · Types of anesthetics you have had in the past.  · Any blood disorders you have.  · Any surgeries you have had.  · Any medical conditions you have.  · Any recent upper respiratory, chest, or ear infections.  · Any history of:  ? Heart or lung conditions, such as heart failure, sleep apnea, asthma, or chronic obstructive pulmonary disease (COPD).  ?  service.  ? Depression or anxiety.  · Any tobacco or drug use, including marijuana or alcohol use.  · Whether you are pregnant or may be pregnant.  What are the risks?  Generally, this is a safe procedure. However, problems may occur, including:  · Allergic reaction.  · Lung and heart " problems.  · Inhaling food or liquid from the stomach into the lungs (aspiration).  · Nerve injury.  · Air in the bloodstream, which can lead to stroke.  · Extreme agitation or confusion (delirium) when you wake up from the anesthetic.  · Waking up during your procedure and being unable to move. This is rare.  These problems are more likely to develop if you are having a major surgery or if you have an advanced or serious medical condition. You can prevent some of these complications by answering all of your health care provider's questions thoroughly and by following all instructions before your procedure.  General anesthesia can cause side effects, including:  · Nausea or vomiting.  · A sore throat from the breathing tube.  · Hoarseness.  · Wheezing or coughing.  · Shaking chills.  · Tiredness.  · Body aches.  · Anxiety.  · Sleepiness or drowsiness.  · Confusion or agitation.  RISKS AND COMPLICATIONS OF SURGERY  Your health care provider will discuss possible risks and complications with you before surgery. Common risks and complications include:    · Problems due to the use of anesthetics.  · Blood loss and replacement (does not apply to minor surgical procedures).  · Temporary increase in pain due to surgery.  · Uncorrected pain or problems that the surgery was meant to correct.  · Infection.  · New damage.    What happens before the procedure?    Medicines  Ask your health care provider about:  · Changing or stopping your regular medicines. This is especially important if you are taking diabetes medicines or blood thinners.  · Taking medicines such as aspirin and ibuprofen. These medicines can thin your blood. Do not take these medicines unless your health care provider tells you to take them.  · Taking over-the-counter medicines, vitamins, herbs, and supplements. Do not take these during the week before your procedure unless your health care provider approves them.  General instructions  · Starting 3-6 weeks  before the procedure, do not use any products that contain nicotine or tobacco, such as cigarettes and e-cigarettes. If you need help quitting, ask your health care provider.  · If you brush your teeth on the morning of the procedure, make sure to spit out all of the toothpaste.  · Tell your health care provider if you become ill or develop a cold, cough, or fever.  · If instructed by your health care provider, bring your sleep apnea device with you on the day of your surgery (if applicable).  · Ask your health care provider if you will be going home the same day, the following day, or after a longer hospital stay.  ? Plan to have someone take you home from the hospital or clinic.  ? Plan to have a responsible adult care for you for at least 24 hours after you leave the hospital or clinic. This is important.  What happens during the procedure?  · You will be given anesthetics through both of the following:  ? A mask placed over your nose and mouth.  ? An IV in one of your veins.  · You may receive a medicine to help you relax (sedative).  · After you are unconscious, a breathing tube may be inserted down your throat to help you breathe. This will be removed before you wake up.  · An anesthesia specialist will stay with you throughout your procedure. He or she will:  ? Keep you comfortable and safe by continuing to give you medicines and adjusting the amount of medicine that you get.  ? Monitor your blood pressure, pulse, and oxygen levels to make sure that the anesthetics do not cause any problems.  The procedure may vary among health care providers and hospitals.  What happens after the procedure?  · Your blood pressure, temperature, heart rate, breathing rate, and blood oxygen level will be monitored until the medicines you were given have worn off.  · You will wake up in a recovery area. You may wake up slowly.  · If you feel anxious or agitated, you may be given medicine to help you calm down.  · If you will be  going home the same day, your health care provider may check to make sure you can walk, drink, and urinate.  · Your health care provider will treat any pain or side effects you have before you go home.  · Do not drive for 24 hours if you were given a sedative.  Summary  · General anesthesia is used to keep you still and prevent pain during a procedure.  · It is important to tell your healthcare provider about your medical history and any surgeries you have had, and previous experience with anesthesia.  · Follow your healthcare provider’s instructions about when to stop eating, drinking, or taking certain medicines before your procedure.  · Plan to have someone take you home from the hospital or clinic.  This information is not intended to replace advice given to you by your health care provider. Make sure you discuss any questions you have with your health care provider.  Document Released: 03/26/2009 Document Revised: 08/03/2018 Document Reviewed: 08/03/2018  [employer] Interactive Patient Education © 2019 [employer]       Fall Prevention in Hospitals, Adult  As a hospital patient, your condition and the treatments you receive can increase your risk for falls. Some additional risk factors for falls in a hospital include:  · Being in an unfamiliar environment.  · Being on bed rest.  · Your surgery.  · Taking certain medicines.  · Your tubing requirements, such as intravenous (IV) therapy or catheters.  It is important that you learn how to decrease fall risks while at the hospital. Below are important tips that can help prevent falls.  SAFETY TIPS FOR PREVENTING FALLS  Talk about your risk of falling.  · Ask your health care provider why you are at risk for falling. Is it your medicine, illness, tubing placement, or something else?  · Make a plan with your health care provider to keep you safe from falls.  · Ask your health care provider or pharmacist about side effects of your medicines. Some medicines can make  you dizzy or affect your coordination.  Ask for help.  · Ask for help before getting out of bed. You may need to press your call button.  · Ask for assistance in getting safely to the toilet.  · Ask for a walker or cane to be put at your bedside. Ask that most of the side rails on your bed be placed up before your health care provider leaves the room.  · Ask family or friends to sit with you.  · Ask for things that are out of your reach, such as your glasses, hearing aids, telephone, bedside table, or call button.  Follow these tips to avoid falling:  · Stay lying or seated, rather than standing, while waiting for help.  · Wear rubber-soled slippers or shoes whenever you walk in the hospital.  · Avoid quick, sudden movements.  ¨ Change positions slowly.  ¨ Sit on the side of your bed before standing.  ¨ Stand up slowly and wait before you start to walk.  · Let your health care provider know if there is a spill on the floor.  · Pay careful attention to the medical equipment, electrical cords, and tubes around you.  · When you need help, use your call button by your bed or in the bathroom. Wait for one of your health care providers to help you.  · If you feel dizzy or unsure of your footing, return to bed and wait for assistance.  · Avoid being distracted by the TV, telephone, or another person in your room.  · Do not lean or support yourself on rolling objects, such as IV poles or bedside tables.     This information is not intended to replace advice given to you by your health care provider. Make sure you discuss any questions you have with your health care provider.     Document Released: 12/15/2001 Document Revised: 01/08/2016 Document Reviewed: 08/25/2013  [employer] Interactive Patient Education ©2016 [employer]       [facility]  CHG 4% Patient Instruction Sheet    Chlorhexidine Before Surgery  Chlorhexidine gluconate (CHG) is a germ-killing (antiseptic) solution that is used to clean the skin. It  gets rid of the bacteria that normally live on the skin. Cleaning your skin with CHG before surgery helps lower the risk for infection after surgery.    How to use CHG solution  · You will take 2 showers, one shower the night before surgery, the second shower the morning of surgery before coming to the hospital.  · Use CHG only as told by your health care provider, and follow the instructions on the label.  · Use CHG solution while taking a shower. Follow these steps when using CHG solution (unless your health care provider gives you different instructions):  1. Start the shower.  2. Use your normal soap and shampoo to wash your face and hair.  3. Turn off the shower or move out of the shower stream.  4. Pour the CHG onto a clean washcloth. Do not use any type of brush or rough-edged sponge.  5. Starting at your neck, lather your body down to your toes. Make sure you:  6. Pay special attention to the part of your body where you will be having surgery. Scrub this area for at least 1 minute.  7. Use the full amount of CHG as directed. Usually, this is one half bottle for each shower.  8. Do not use CHG on your head or face. If the solution gets into your ears or eyes, rinse them well with water.  9. Avoid your genital area.  10. Avoid any areas of skin that have broken skin, cuts, or scrapes.  11. Scrub your back and under your arms. Make sure to wash skin folds.  12. Let the lather sit on your skin for 1-2 minutes or as long as told by your health care  provider.  13. Thoroughly rinse your entire body in the shower. Make sure that all body creases and crevices are rinsed well.  14. Dry off with a clean towel. Do not put any substances on your body afterward, such as powder, lotion, or perfume.  15. Put on clean clothes or pajamas.  16. If it is the night before your surgery, sleep in clean sheets.    What are the risks?  Risks of using CHG include:  · A skin reaction.  · Hearing loss, if CHG gets in your ears.  · Eye  injury, if CHG gets in your eyes and is not rinsed out.  · The CHG product catching fire.  Make sure that you avoid smoking and flames after applying CHG to your skin.  Do not use CHG:  · If you have a chlorhexidine allergy or have previously reacted to chlorhexidine.  · On babies younger than 2 months of age.      On the day of surgery, when you are taken to your room in Outpatient Surgery you will be given a CHG prepackaged cloth to wipe the site for your surgery.  How to use CHG prepackaged cloths  · Follow the instructions on the label.  · Use the CHG cloth on clean, dry skin. Follow these steps when using a CHG cloth (unless your health care provider gives you different instructions):  1. Using the CHG cloth, vigorously scrub the part of your body where you will be having surgery. Scrub using a back-and-forth motion for 3 minutes. The area on your body should be completely wet with CHG when you are finished scrubbing.  2. Do not rinse. Discard the cloth and let the area air-dry for 1 minute. Do not put any substances on your body afterward, such as powder, lotion, or perfume.  Contact a health care provider if:  · Your skin gets irritated after scrubbing.  · You have questions about using your solution or cloth.  Get help right away if:  · Your eyes become very red or swollen.  · Your eyes itch badly.  · Your skin itches badly and is red or swollen.  · Your hearing changes.  · You have trouble seeing.  · You have swelling or tingling in your mouth or throat.  · You have trouble breathing.  · You swallow any chlorhexidine.  Summary  · Chlorhexidine gluconate (CHG) is a germ-killing (antiseptic) solution that is used to clean the skin. Cleaning your skin with CHG before surgery helps lower the risk for infection after surgery.  · You may be given CHG to use at home. It may be in a bottle or in a prepackaged cloth to use on your skin. Carefully follow your health care provider's instructions and the instructions  on the product label.  · Do not use CHG if you have a chlorhexidine allergy.  · Contact your health care provider if your skin gets irritated after scrubbing.  This information is not intended to replace advice given to you by your health care provider. Make sure you discuss any questions you have with your health care provider.  Document Released: 09/11/2013 Document Revised: 11/15/2018 Document Reviewed: 11/15/2018  "[employer]" Interactive Patient Education © 2019 "[employer]          PATIENT/FAMILY/RESPONSIBLE PARTY VERBALIZES UNDERSTANDING OF ABOVE EDUCATION.  COPY OF PAIN SCALE GIVEN AND REVIEWED WITH VERBALIZED UNDERSTANDING.

## 2021-12-20 ENCOUNTER — HOSPITAL ENCOUNTER (OUTPATIENT)
Facility: HOSPITAL | Age: 31
Setting detail: HOSPITAL OUTPATIENT SURGERY
Discharge: HOME | End: 2021-12-20
Attending: OBSTETRICS & GYNECOLOGY | Admitting: OBSTETRICS & GYNECOLOGY

## 2021-12-20 ENCOUNTER — ANESTHESIA EVENT (OUTPATIENT)
Dept: PERIOP | Facility: HOSPITAL | Age: 31
End: 2021-12-20

## 2021-12-20 ENCOUNTER — ANESTHESIA (OUTPATIENT)
Dept: PERIOP | Facility: HOSPITAL | Age: 31
End: 2021-12-20

## 2021-12-20 VITALS
DIASTOLIC BLOOD PRESSURE: 72 MMHG | RESPIRATION RATE: 16 BRPM | HEART RATE: 67 BPM | SYSTOLIC BLOOD PRESSURE: 106 MMHG | OXYGEN SATURATION: 99 % | TEMPERATURE: 97.4 F

## 2021-12-20 DIAGNOSIS — Z30.2 REQUEST FOR STERILIZATION: ICD-10-CM

## 2021-12-20 PROBLEM — Z37.9 NORMAL LABOR: Status: RESOLVED | Noted: 2021-05-28 | Resolved: 2021-12-20

## 2021-12-20 PROBLEM — Z34.90 PREGNANCY: Status: RESOLVED | Noted: 2021-04-08 | Resolved: 2021-12-20

## 2021-12-20 LAB — B-HCG UR QL: NEGATIVE

## 2021-12-20 PROCEDURE — 25010000002 ONDANSETRON PER 1 MG: Performed by: ANESTHESIOLOGY

## 2021-12-20 PROCEDURE — 25010000002 DEXAMETHASONE PER 1 MG: Performed by: NURSE ANESTHETIST, CERTIFIED REGISTERED

## 2021-12-20 PROCEDURE — 81025 URINE PREGNANCY TEST: CPT | Performed by: OBSTETRICS & GYNECOLOGY

## 2021-12-20 PROCEDURE — 88302 TISSUE EXAM BY PATHOLOGIST: CPT | Performed by: OBSTETRICS & GYNECOLOGY

## 2021-12-20 PROCEDURE — 25010000002 ONDANSETRON PER 1 MG: Performed by: NURSE ANESTHETIST, CERTIFIED REGISTERED

## 2021-12-20 PROCEDURE — 25010000002 KETOROLAC TROMETHAMINE PER 15 MG: Performed by: NURSE ANESTHETIST, CERTIFIED REGISTERED

## 2021-12-20 PROCEDURE — 25010000002 FENTANYL CITRATE (PF) 50 MCG/ML SOLUTION: Performed by: ANESTHESIOLOGY

## 2021-12-20 PROCEDURE — 25010000002 FENTANYL CITRATE (PF) 100 MCG/2ML SOLUTION: Performed by: NURSE ANESTHETIST, CERTIFIED REGISTERED

## 2021-12-20 PROCEDURE — 25010000002 MIDAZOLAM PER 1 MG: Performed by: ANESTHESIOLOGY

## 2021-12-20 PROCEDURE — 25010000002 PROPOFOL 10 MG/ML EMULSION: Performed by: NURSE ANESTHETIST, CERTIFIED REGISTERED

## 2021-12-20 RX ORDER — SODIUM CHLORIDE 0.9 % (FLUSH) 0.9 %
3 SYRINGE (ML) INJECTION EVERY 12 HOURS SCHEDULED
Status: DISCONTINUED | OUTPATIENT
Start: 2021-12-20 | End: 2021-12-20 | Stop reason: HOSPADM

## 2021-12-20 RX ORDER — ROCURONIUM BROMIDE 10 MG/ML
INJECTION, SOLUTION INTRAVENOUS AS NEEDED
Status: DISCONTINUED | OUTPATIENT
Start: 2021-12-20 | End: 2021-12-20 | Stop reason: SURG

## 2021-12-20 RX ORDER — ONDANSETRON 2 MG/ML
INJECTION INTRAMUSCULAR; INTRAVENOUS AS NEEDED
Status: DISCONTINUED | OUTPATIENT
Start: 2021-12-20 | End: 2021-12-20 | Stop reason: SURG

## 2021-12-20 RX ORDER — LIDOCAINE HYDROCHLORIDE 20 MG/ML
INJECTION, SOLUTION EPIDURAL; INFILTRATION; INTRACAUDAL; PERINEURAL AS NEEDED
Status: DISCONTINUED | OUTPATIENT
Start: 2021-12-20 | End: 2021-12-20 | Stop reason: SURG

## 2021-12-20 RX ORDER — IBUPROFEN 800 MG/1
800 TABLET ORAL EVERY 8 HOURS PRN
Qty: 20 TABLET | Refills: 0 | Status: SHIPPED | OUTPATIENT
Start: 2021-12-20 | End: 2022-04-27

## 2021-12-20 RX ORDER — MIDAZOLAM HYDROCHLORIDE 1 MG/ML
1 INJECTION INTRAMUSCULAR; INTRAVENOUS
Status: DISCONTINUED | OUTPATIENT
Start: 2021-12-20 | End: 2021-12-20 | Stop reason: HOSPADM

## 2021-12-20 RX ORDER — FENTANYL CITRATE 50 UG/ML
25 INJECTION, SOLUTION INTRAMUSCULAR; INTRAVENOUS
Status: DISCONTINUED | OUTPATIENT
Start: 2021-12-20 | End: 2021-12-20 | Stop reason: HOSPADM

## 2021-12-20 RX ORDER — OXYCODONE HYDROCHLORIDE AND ACETAMINOPHEN 5; 325 MG/1; MG/1
1 TABLET ORAL EVERY 6 HOURS PRN
Qty: 20 TABLET | Refills: 0 | Status: SHIPPED | OUTPATIENT
Start: 2021-12-20 | End: 2022-04-27

## 2021-12-20 RX ORDER — FLUMAZENIL 0.1 MG/ML
0.2 INJECTION INTRAVENOUS AS NEEDED
Status: DISCONTINUED | OUTPATIENT
Start: 2021-12-20 | End: 2021-12-20 | Stop reason: HOSPADM

## 2021-12-20 RX ORDER — FENTANYL CITRATE 50 UG/ML
INJECTION, SOLUTION INTRAMUSCULAR; INTRAVENOUS AS NEEDED
Status: DISCONTINUED | OUTPATIENT
Start: 2021-12-20 | End: 2021-12-20 | Stop reason: SURG

## 2021-12-20 RX ORDER — ONDANSETRON 2 MG/ML
4 INJECTION INTRAMUSCULAR; INTRAVENOUS ONCE AS NEEDED
Status: COMPLETED | OUTPATIENT
Start: 2021-12-20 | End: 2021-12-20

## 2021-12-20 RX ORDER — KETOROLAC TROMETHAMINE 30 MG/ML
INJECTION, SOLUTION INTRAMUSCULAR; INTRAVENOUS AS NEEDED
Status: DISCONTINUED | OUTPATIENT
Start: 2021-12-20 | End: 2021-12-20 | Stop reason: SURG

## 2021-12-20 RX ORDER — SCOLOPAMINE TRANSDERMAL SYSTEM 1 MG/1
1 PATCH, EXTENDED RELEASE TRANSDERMAL CONTINUOUS
Status: DISCONTINUED | OUTPATIENT
Start: 2021-12-20 | End: 2021-12-20 | Stop reason: HOSPADM

## 2021-12-20 RX ORDER — ACETAMINOPHEN 500 MG
1000 TABLET ORAL ONCE
Status: COMPLETED | OUTPATIENT
Start: 2021-12-20 | End: 2021-12-20

## 2021-12-20 RX ORDER — SODIUM CHLORIDE 0.9 % (FLUSH) 0.9 %
3 SYRINGE (ML) INJECTION AS NEEDED
Status: DISCONTINUED | OUTPATIENT
Start: 2021-12-20 | End: 2021-12-20 | Stop reason: HOSPADM

## 2021-12-20 RX ORDER — SODIUM CHLORIDE, SODIUM LACTATE, POTASSIUM CHLORIDE, CALCIUM CHLORIDE 600; 310; 30; 20 MG/100ML; MG/100ML; MG/100ML; MG/100ML
1000 INJECTION, SOLUTION INTRAVENOUS CONTINUOUS
Status: DISCONTINUED | OUTPATIENT
Start: 2021-12-20 | End: 2021-12-20 | Stop reason: HOSPADM

## 2021-12-20 RX ORDER — LIDOCAINE HYDROCHLORIDE 10 MG/ML
0.5 INJECTION, SOLUTION EPIDURAL; INFILTRATION; INTRACAUDAL; PERINEURAL ONCE AS NEEDED
Status: DISCONTINUED | OUTPATIENT
Start: 2021-12-20 | End: 2021-12-20 | Stop reason: HOSPADM

## 2021-12-20 RX ORDER — LIDOCAINE HYDROCHLORIDE 40 MG/ML
SOLUTION TOPICAL AS NEEDED
Status: DISCONTINUED | OUTPATIENT
Start: 2021-12-20 | End: 2021-12-20 | Stop reason: SURG

## 2021-12-20 RX ORDER — NEOSTIGMINE METHYLSULFATE 5 MG/5 ML
SYRINGE (ML) INTRAVENOUS AS NEEDED
Status: DISCONTINUED | OUTPATIENT
Start: 2021-12-20 | End: 2021-12-20 | Stop reason: SURG

## 2021-12-20 RX ORDER — LABETALOL HYDROCHLORIDE 5 MG/ML
5 INJECTION, SOLUTION INTRAVENOUS
Status: DISCONTINUED | OUTPATIENT
Start: 2021-12-20 | End: 2021-12-20 | Stop reason: HOSPADM

## 2021-12-20 RX ORDER — IBUPROFEN 600 MG/1
600 TABLET ORAL ONCE AS NEEDED
Status: DISCONTINUED | OUTPATIENT
Start: 2021-12-20 | End: 2021-12-20 | Stop reason: HOSPADM

## 2021-12-20 RX ORDER — OXYCODONE AND ACETAMINOPHEN 7.5; 325 MG/1; MG/1
2 TABLET ORAL EVERY 4 HOURS PRN
Status: DISCONTINUED | OUTPATIENT
Start: 2021-12-20 | End: 2021-12-20 | Stop reason: HOSPADM

## 2021-12-20 RX ORDER — NALOXONE HCL 0.4 MG/ML
0.4 VIAL (ML) INJECTION AS NEEDED
Status: DISCONTINUED | OUTPATIENT
Start: 2021-12-20 | End: 2021-12-20 | Stop reason: HOSPADM

## 2021-12-20 RX ORDER — MIDAZOLAM HYDROCHLORIDE 1 MG/ML
2 INJECTION INTRAMUSCULAR; INTRAVENOUS ONCE
Status: COMPLETED | OUTPATIENT
Start: 2021-12-20 | End: 2021-12-20

## 2021-12-20 RX ORDER — SODIUM CHLORIDE 0.9 % (FLUSH) 0.9 %
3-10 SYRINGE (ML) INJECTION AS NEEDED
Status: DISCONTINUED | OUTPATIENT
Start: 2021-12-20 | End: 2021-12-20 | Stop reason: HOSPADM

## 2021-12-20 RX ORDER — PROPOFOL 10 MG/ML
VIAL (ML) INTRAVENOUS AS NEEDED
Status: DISCONTINUED | OUTPATIENT
Start: 2021-12-20 | End: 2021-12-20 | Stop reason: SURG

## 2021-12-20 RX ORDER — OXYCODONE AND ACETAMINOPHEN 10; 325 MG/1; MG/1
1 TABLET ORAL ONCE AS NEEDED
Status: DISCONTINUED | OUTPATIENT
Start: 2021-12-20 | End: 2021-12-20 | Stop reason: HOSPADM

## 2021-12-20 RX ORDER — DEXAMETHASONE SODIUM PHOSPHATE 4 MG/ML
INJECTION, SOLUTION INTRA-ARTICULAR; INTRALESIONAL; INTRAMUSCULAR; INTRAVENOUS; SOFT TISSUE AS NEEDED
Status: DISCONTINUED | OUTPATIENT
Start: 2021-12-20 | End: 2021-12-20 | Stop reason: SURG

## 2021-12-20 RX ORDER — SODIUM CHLORIDE, SODIUM LACTATE, POTASSIUM CHLORIDE, CALCIUM CHLORIDE 600; 310; 30; 20 MG/100ML; MG/100ML; MG/100ML; MG/100ML
100 INJECTION, SOLUTION INTRAVENOUS CONTINUOUS
Status: DISCONTINUED | OUTPATIENT
Start: 2021-12-20 | End: 2021-12-20 | Stop reason: HOSPADM

## 2021-12-20 RX ORDER — LIDOCAINE 50 MG/G
PATCH TOPICAL AS NEEDED
Status: DISCONTINUED | OUTPATIENT
Start: 2021-12-20 | End: 2021-12-20 | Stop reason: HOSPADM

## 2021-12-20 RX ORDER — BUPIVACAINE HYDROCHLORIDE 5 MG/ML
INJECTION, SOLUTION PERINEURAL AS NEEDED
Status: DISCONTINUED | OUTPATIENT
Start: 2021-12-20 | End: 2021-12-20 | Stop reason: HOSPADM

## 2021-12-20 RX ADMIN — FENTANYL CITRATE 25 MCG: 50 INJECTION INTRAMUSCULAR; INTRAVENOUS at 14:32

## 2021-12-20 RX ADMIN — PROPOFOL 150 MG: 10 INJECTION, EMULSION INTRAVENOUS at 13:32

## 2021-12-20 RX ADMIN — KETOROLAC TROMETHAMINE 30 MG: 30 INJECTION, SOLUTION INTRAMUSCULAR; INTRAVENOUS at 14:01

## 2021-12-20 RX ADMIN — FENTANYL CITRATE 25 MCG: 50 INJECTION INTRAMUSCULAR; INTRAVENOUS at 14:37

## 2021-12-20 RX ADMIN — ONDANSETRON 4 MG: 2 INJECTION INTRAMUSCULAR; INTRAVENOUS at 14:27

## 2021-12-20 RX ADMIN — Medication 3 MG: at 14:01

## 2021-12-20 RX ADMIN — ROCURONIUM BROMIDE 20 MG: 10 INJECTION INTRAVENOUS at 13:32

## 2021-12-20 RX ADMIN — DEXAMETHASONE SODIUM PHOSPHATE 4 MG: 4 INJECTION, SOLUTION INTRA-ARTICULAR; INTRALESIONAL; INTRAMUSCULAR; INTRAVENOUS; SOFT TISSUE at 13:45

## 2021-12-20 RX ADMIN — LIDOCAINE HYDROCHLORIDE 100 MG: 20 INJECTION, SOLUTION EPIDURAL; INFILTRATION; INTRACAUDAL; PERINEURAL at 13:31

## 2021-12-20 RX ADMIN — FENTANYL CITRATE 25 MCG: 50 INJECTION INTRAMUSCULAR; INTRAVENOUS at 14:27

## 2021-12-20 RX ADMIN — MIDAZOLAM 2 MG: 1 INJECTION INTRAMUSCULAR; INTRAVENOUS at 11:17

## 2021-12-20 RX ADMIN — SODIUM CHLORIDE, POTASSIUM CHLORIDE, SODIUM LACTATE AND CALCIUM CHLORIDE 1000 ML: 600; 310; 30; 20 INJECTION, SOLUTION INTRAVENOUS at 10:37

## 2021-12-20 RX ADMIN — ONDANSETRON 4 MG: 2 INJECTION INTRAMUSCULAR; INTRAVENOUS at 13:46

## 2021-12-20 RX ADMIN — LIDOCAINE HYDROCHLORIDE 1 EACH: 40 SOLUTION TOPICAL at 13:32

## 2021-12-20 RX ADMIN — FENTANYL CITRATE 100 MCG: 50 INJECTION, SOLUTION INTRAMUSCULAR; INTRAVENOUS at 13:32

## 2021-12-20 RX ADMIN — SODIUM CHLORIDE, POTASSIUM CHLORIDE, SODIUM LACTATE AND CALCIUM CHLORIDE: 600; 310; 30; 20 INJECTION, SOLUTION INTRAVENOUS at 13:55

## 2021-12-20 RX ADMIN — GLYCOPYRROLATE 0.4 MG: 0.2 INJECTION, SOLUTION INTRAMUSCULAR; INTRAVENOUS at 14:00

## 2021-12-20 RX ADMIN — ACETAMINOPHEN 1000 MG: 500 TABLET, FILM COATED ORAL at 11:17

## 2021-12-20 RX ADMIN — SCOLOPAMINE TRANSDERMAL SYSTEM 1 PATCH: 1 PATCH, EXTENDED RELEASE TRANSDERMAL at 11:17

## 2021-12-20 NOTE — DISCHARGE INSTRUCTIONS

## 2021-12-20 NOTE — H&P
[facility]   Juanita Cedillo  : 1990  MRN: [id number]  CSN: [id number]    History and Physical    Subjective   Juanita Cedillo is a 31 y.o.  who present for surgery for tubal sterilization.  rba given, instructions given, pt has hypothyroidism and  Reports smoking. Plan to go to or for salpingectomy, bilateral .    Past Medical History:   Diagnosis Date   • Anxiety    • Disease of thyroid gland    • Eating disorder      Past Surgical History:   Procedure Laterality Date   • WISDOM TOOTH EXTRACTION       Social History    Tobacco Use      Smoking status: Current Some Day Smoker        Packs/day: 0.50        Years: 8.00        Pack years: 4        Types: Cigarettes      Smokeless tobacco: Never Used      Current Facility-Administered Medications:   •  lactated ringers infusion 1,000 mL, 1,000 mL, Intravenous, Continuous, Sebastian Navas MD, Last Rate: 25 mL/hr at 21 1037, 1,000 mL at 21 1037  •  lactated ringers infusion, 100 mL/hr, Intravenous, Continuous, Amari Tafoya MD  •  lidocaine PF 1% (XYLOCAINE) injection 0.5 mL, 0.5 mL, Intradermal, Once PRN, Sebastian Navas MD  •  lidocaine PF 1% (XYLOCAINE) injection 0.5 mL, 0.5 mL, Injection, Once PRN, Amari Tafoya MD  •  midazolam (VERSED) injection 1 mg, 1 mg, Intravenous, Q10 Min PRN, Amari Tafoya MD  •  scopolamine patch 1 mg/72 hr, 1 patch, Transdermal, Continuous, Amari Tafoya MD, 1 patch at 21 1117  •  sodium chloride 0.9 % flush 3 mL, 3 mL, Intravenous, PRN, Sebastian Navas MD  •  sodium chloride 0.9 % flush 3 mL, 3 mL, Intravenous, Q12H, Amari Tafoya MD  •  sodium chloride 0.9 % flush 3-10 mL, 3-10 mL, Intravenous, PRN, Amari Tafoya MD    No Known Allergies    Review of Systems      Objective   /73 (BP Location: Left arm, Patient Position: Sitting)   Pulse 69   Temp 97.1 °F (36.2 °C) (Temporal)   Resp 16   LMP 2021 (Within Weeks)   SpO2 99%   Breastfeeding No   General: well developed;  well nourished  no acute distress   Heart: regular rate and rhythm, S1, S2 normal, no murmur, click, rub or gallop   Lungs: breathing is unlabored   Abdomen: soft, non-tender; no masses  no umbilical or inguinal hernias are present  no hepato-splenomegaly   Pelvis:: Clinical staff was present for exam   Labs  Lab Results   Component Value Date     12/17/2021    HGB 12.6 12/17/2021    HCT 40.6 12/17/2021    WBC 7.25 12/17/2021     12/17/2021    K 4.0 12/17/2021     12/17/2021    CO2 26.0 12/17/2021    BUN 7 12/17/2021    CREATININE 0.69 12/17/2021    GLUCOSE 86 12/17/2021    ALBUMIN 4.50 12/17/2021    CALCIUM 9.5 12/17/2021    AST 16 12/17/2021    ALT 11 12/17/2021    BILITOT 0.3 12/17/2021        Assessment   1. Desires sterilization    The risks, benefits, and alternatives of the procedure; along with the risks of anesthesia was discussed in full with the patient and all questions were answered.       Plan   1. anshul salpingectomy    Sebastian Navas MD  12/20/2021  12:11 CST

## 2021-12-20 NOTE — ANESTHESIA POSTPROCEDURE EVALUATION
Patient: Juanita Cedillo    Procedure Summary     Date: 12/20/21 Room / Location:  [facility] OR 09 /  [facility]    Anesthesia Start: 1329 Anesthesia Stop: 1413    Procedure: LAPAROSCOPIC BILATERAL SALPINGECTOMY (Bilateral Abdomen) Diagnosis: (STERILIZATION)    Surgeons: Sebastian Navas MD Provider: Zeferino Barbour CRNA    Anesthesia Type: general ASA Status: 2          Anesthesia Type: general    Vitals  Vitals Value Taken Time   /63 12/20/21 1501   Temp 97.4 °F (36.3 °C) 12/20/21 1505   Pulse 63 12/20/21 1507   Resp 15 12/20/21 1505   SpO2 99 % 12/20/21 1507   Vitals shown include unvalidated device data.        Post Anesthesia Care and Evaluation    Patient location during evaluation: PACU  Patient participation: complete - patient participated  Level of consciousness: awake and alert  Pain management: adequate  Airway patency: patent  Anesthetic complications: No anesthetic complications  PONV Status: none  Cardiovascular status: acceptable and hemodynamically stable  Respiratory status: acceptable  Hydration status: acceptable    Comments: Blood pressure 106/63, pulse 57, temperature 97.4 °F (36.3 °C), temperature source Temporal, resp. rate 16, last menstrual period 12/06/2021, SpO2 99 %, not currently breastfeeding.    Patient discharged from PACU based upon Margie score. Please see RN notes for further details

## 2021-12-20 NOTE — ANESTHESIA PREPROCEDURE EVALUATION
Anesthesia Evaluation     Patient summary reviewed   no history of anesthetic complications:  NPO Solid Status: > 6 hours  NPO Liquid Status: > 6 hours           Airway   Mallampati: II  Dental          Pulmonary    (+) a smoker Current Smoked day of surgery,   Cardiovascular - negative cardio ROS        Neuro/Psych  (-) seizures, CVA  GI/Hepatic/Renal/Endo    (+)   thyroid problem hypothyroidism  (-) liver disease, no renal disease, diabetes    Musculoskeletal     Abdominal    Substance History      OB/GYN          Other                        Anesthesia Plan    ASA 2     general     intravenous induction     Anesthetic plan, all risks, benefits, and alternatives have been provided, discussed and informed consent has been obtained with: patient.

## 2021-12-20 NOTE — ANESTHESIA PROCEDURE NOTES
Airway  Date/Time: 12/20/2021 1:34 PM  Airway not difficult    General Information and Staff    Patient location during procedure: OR  CRNA: Zeferino Barbour CRNA    Indications and Patient Condition  Indications for airway management: airway protection    Preoxygenated: yes  Mask difficulty assessment: 0 - not attempted    Final Airway Details  Final airway type: endotracheal airway      Successful airway: ETT  Cuffed: yes   Successful intubation technique: direct laryngoscopy  Blade: Bagley  Blade size: 2  ETT size (mm): 7.0  Cormack-Lehane Classification: grade I - full view of glottis  Placement verified by: chest auscultation and capnometry   Cuff volume (mL): 6  Measured from: teeth  ETT/EBT  to teeth (cm): 21  Number of attempts at approach: 1  Assessment: lips, teeth, and gum same as pre-op and atraumatic intubation

## 2021-12-21 LAB
CYTO UR: NORMAL
LAB AP CASE REPORT: NORMAL
PATH REPORT.FINAL DX SPEC: NORMAL
PATH REPORT.GROSS SPEC: NORMAL

## 2022-04-27 ENCOUNTER — OFFICE VISIT (OUTPATIENT)
Dept: INTERNAL MEDICINE | Facility: CLINIC | Age: 32
End: 2022-04-27

## 2022-04-27 ENCOUNTER — LAB (OUTPATIENT)
Dept: LAB | Facility: HOSPITAL | Age: 32
End: 2022-04-27

## 2022-04-27 VITALS
OXYGEN SATURATION: 98 % | DIASTOLIC BLOOD PRESSURE: 66 MMHG | HEART RATE: 108 BPM | BODY MASS INDEX: 17.93 KG/M2 | SYSTOLIC BLOOD PRESSURE: 98 MMHG | HEIGHT: 64 IN | WEIGHT: 105 LBS

## 2022-04-27 DIAGNOSIS — F41.9 ANXIETY AND DEPRESSION: ICD-10-CM

## 2022-04-27 DIAGNOSIS — F32.A ANXIETY AND DEPRESSION: ICD-10-CM

## 2022-04-27 DIAGNOSIS — F41.9 ANXIETY AND DEPRESSION: Primary | ICD-10-CM

## 2022-04-27 DIAGNOSIS — F32.A ANXIETY AND DEPRESSION: Primary | ICD-10-CM

## 2022-04-27 LAB
25(OH)D3 SERPL-MCNC: 40.8 NG/ML (ref 30–100)
TSH SERPL DL<=0.05 MIU/L-ACNC: 2.57 UIU/ML (ref 0.27–4.2)

## 2022-04-27 PROCEDURE — 82306 VITAMIN D 25 HYDROXY: CPT

## 2022-04-27 PROCEDURE — 84443 ASSAY THYROID STIM HORMONE: CPT

## 2022-04-27 PROCEDURE — 36415 COLL VENOUS BLD VENIPUNCTURE: CPT

## 2022-04-27 PROCEDURE — 99204 OFFICE O/P NEW MOD 45 MIN: CPT | Performed by: NURSE PRACTITIONER

## 2022-04-27 RX ORDER — SERTRALINE HYDROCHLORIDE 25 MG/1
25 TABLET, FILM COATED ORAL DAILY
Qty: 30 TABLET | Refills: 0 | Status: SHIPPED | OUTPATIENT
Start: 2022-04-27 | End: 2022-05-24

## 2022-04-28 NOTE — PROGRESS NOTES
Chief Complaint   Patient presents with   • Establish Care   • Anxiety   • Depression         History:  Juanita Cedillo is a 31 y.o. female who presents today for evaluation of the above problems.          Here to establish care with PCP.    Was on thyroid medication at one time but then was told she didn't need it.     Past history remarkable for post-partum depression.  Looking back, had issues with depression relating to past events in her childhood.  Drug use in past, but got clean when she got pregnant with her first child, 9 years old now.  She and her  have 4 children.    She is on edge a lot, feels strained with caring for 4 children without much support.  A neighbor sometimes watches the baby for her.  She does yoga and Pilates while the baby sleeps.  Drinks a lot of caffeine, doesn't sleep well.  No suicidal ideation, but feels the post-partum depression never really left after her last baby 9 months ago.  Was on Buspar but gave her a headache and didn't really help.    Would be willing to talk to a counselor to work thru issues dating back to early childhood.      ROS:  Review of Systems  As above    No Known Allergies  Past Medical History:   Diagnosis Date   • Anxiety    • Depression    • Disease of thyroid gland    • Eating disorder      Past Surgical History:   Procedure Laterality Date   • SALPINGECTOMY Bilateral 12/20/2021    Procedure: LAPAROSCOPIC BILATERAL SALPINGECTOMY;  Surgeon: Sebastian Navas MD;  Location: [facility];  Service: Obstetrics/Gynecology;  Laterality: Bilateral;   • WISDOM TOOTH EXTRACTION       Family History   Problem Relation Age of Onset   • Depression Mother    • Drug abuse Father    • Alcohol abuse Father    • Cancer Maternal Grandmother    • Alcohol abuse Maternal Grandmother    • Diabetes Maternal Grandfather    • Stroke Maternal Grandfather    • Alcohol abuse Paternal Grandmother    • Cancer Paternal Grandmother         kidney & bone   • Diabetes Paternal  "Grandfather    • Dementia Paternal Grandfather      Juanita  reports that she has been smoking cigarettes. She has a 7.00 pack-year smoking history. She has never used smokeless tobacco. She reports current alcohol use. She reports that she does not use drugs.    I have reviewed and updated the above documentation (if necessary) including but not limited to chief complaint, ROS, PFSH, allergies and medications        Current Outpatient Medications:   •  sertraline (Zoloft) 25 MG tablet, Take 1 tablet by mouth Daily., Disp: 30 tablet, Rfl: 0    OBJECTIVE:  Visit Vitals  BP 98/66 (BP Location: Left arm, Patient Position: Sitting, Cuff Size: Adult)   Pulse 108   Ht 162.6 cm (64\")   Wt 47.6 kg (105 lb)   SpO2 98%   BMI 18.02 kg/m²      Physical Exam  Vitals and nursing note reviewed.   Constitutional:       General: She is not in acute distress.     Appearance: Normal appearance. She is normal weight. She is not ill-appearing, toxic-appearing or diaphoretic.      Comments: Very pleasant, appropriate, actively mothering baby and pre-schooler during visit.   HENT:      Head: Normocephalic and atraumatic.   Eyes:      General: No scleral icterus.  Neck:      Vascular: No carotid bruit.      Comments: No thyromegaly appreciated  Cardiovascular:      Rate and Rhythm: Normal rate and regular rhythm.      Heart sounds: Normal heart sounds. No murmur heard.  Pulmonary:      Effort: Pulmonary effort is normal. No respiratory distress.      Breath sounds: Normal breath sounds. No wheezing.   Abdominal:      General: There is no distension.      Palpations: Abdomen is soft. There is no mass.      Tenderness: There is no abdominal tenderness.   Musculoskeletal:      Cervical back: Neck supple. No rigidity or tenderness.      Right lower leg: No edema.      Left lower leg: No edema.   Lymphadenopathy:      Cervical: No cervical adenopathy.   Skin:     General: Skin is warm and dry.   Neurological:      Mental Status: She is alert " and oriented to person, place, and time.   Psychiatric:         Mood and Affect: Mood normal.         Behavior: Behavior normal.         Thought Content: Thought content normal.         Judgment: Judgment normal.     PHQ-9 Depression Screening  Little interest or pleasure in doing things? 2-->more than half the days   Feeling down, depressed, or hopeless? 2-->more than half the days   Trouble falling or staying asleep, or sleeping too much? 3-->nearly every day   Feeling tired or having little energy? 3-->nearly every day   Poor appetite or overeating? 1-->several days   Feeling bad about yourself - or that you are a failure or have let yourself or your family down? 3-->nearly every day   Trouble concentrating on things, such as reading the newspaper or watching television? 3-->nearly every day   Moving or speaking so slowly that other people could have noticed? Or the opposite - being so fidgety or restless that you have been moving around a lot more than usual? 2-->more than half the days   Thoughts that you would be better off dead, or of hurting yourself in some way? 0-->not at all   PHQ-9 Total Score 19   If you checked off any problems, how difficult have these problems made it for you to do your work, take care of things at home, or get along with other people? somewhat difficult     CHARLI-7 is 21 today.    MDM    Assessment/Plan    Diagnoses and all orders for this visit:    1. Anxiety and depression (Primary)  -     TSH; Future  -     Vitamin D 25 hydroxy; Future  -     Ambulatory Referral to Behavioral Health  -     sertraline (Zoloft) 25 MG tablet; Take 1 tablet by mouth Daily.  Dispense: 30 tablet; Refill: 0      Will check above labs and go ahead and refer to behavioral health.  Begin Zoloft and plan to follow up with PHQ-9 and CHARLI-7 in one month.    Education materials and an After Visit Summary were printed and given to the patient at discharge.  Return in about 4 weeks (around 5/25/2022) for Annual  physical.         Dora Sims, APRN   22:15 CDT  4/27/2022

## 2022-05-24 DIAGNOSIS — F41.9 ANXIETY AND DEPRESSION: ICD-10-CM

## 2022-05-24 DIAGNOSIS — F32.A ANXIETY AND DEPRESSION: ICD-10-CM

## 2022-05-24 RX ORDER — SERTRALINE HYDROCHLORIDE 25 MG/1
25 TABLET, FILM COATED ORAL DAILY
Qty: 30 TABLET | Refills: 0 | Status: SHIPPED | OUTPATIENT
Start: 2022-05-24 | End: 2022-06-06 | Stop reason: SDUPTHER

## 2022-06-06 ENCOUNTER — OFFICE VISIT (OUTPATIENT)
Dept: INTERNAL MEDICINE | Facility: CLINIC | Age: 32
End: 2022-06-06

## 2022-06-06 VITALS
HEIGHT: 63 IN | DIASTOLIC BLOOD PRESSURE: 68 MMHG | WEIGHT: 104 LBS | OXYGEN SATURATION: 98 % | HEART RATE: 78 BPM | BODY MASS INDEX: 18.43 KG/M2 | SYSTOLIC BLOOD PRESSURE: 102 MMHG

## 2022-06-06 DIAGNOSIS — F41.9 ANXIETY AND DEPRESSION: ICD-10-CM

## 2022-06-06 DIAGNOSIS — F32.A ANXIETY AND DEPRESSION: ICD-10-CM

## 2022-06-06 PROCEDURE — 99213 OFFICE O/P EST LOW 20 MIN: CPT | Performed by: NURSE PRACTITIONER

## 2022-06-06 RX ORDER — SERTRALINE HYDROCHLORIDE 25 MG/1
50 TABLET, FILM COATED ORAL DAILY
Qty: 30 TABLET | Refills: 2 | Status: SHIPPED | OUTPATIENT
Start: 2022-06-06 | End: 2022-06-15 | Stop reason: DRUGHIGH

## 2022-06-06 RX ORDER — SERTRALINE HYDROCHLORIDE 25 MG/1
25 TABLET, FILM COATED ORAL DAILY
Qty: 30 TABLET | Refills: 2 | Status: SHIPPED | OUTPATIENT
Start: 2022-06-06 | End: 2022-06-06

## 2022-06-06 NOTE — PROGRESS NOTES
Chief Complaint   Patient presents with   • Anxiety   • Depression         History:  Juanita Cedillo is a 31 y.o. female who presents today for evaluation of the above problems.          Here for 1 month follow up anxiety.  Overall, she feels the Zoloft has helped her feel better.  Since her 2 older boys are home from school, she does feel on edge by 4 PM.  She has 4 children ages 4th grade and below, and the neighbor friend is always at her house too.  She stays up later at night after kids in bed to have time to herself and get things done.     See phone note regarding increasing Zoloft.  She felt a little tired increasing to 50 mg, but she is going to stick with it and take them both together in the morning.      ROS:  Review of Systems  As above    No Known Allergies  Past Medical History:   Diagnosis Date   • Anxiety    • Depression    • Disease of thyroid gland    • Eating disorder      Past Surgical History:   Procedure Laterality Date   • SALPINGECTOMY Bilateral 12/20/2021    Procedure: LAPAROSCOPIC BILATERAL SALPINGECTOMY;  Surgeon: Sebastian Navas MD;  Location: [facility];  Service: Obstetrics/Gynecology;  Laterality: Bilateral;   • WISDOM TOOTH EXTRACTION       Family History   Problem Relation Age of Onset   • Depression Mother    • Drug abuse Father    • Alcohol abuse Father    • Cancer Maternal Grandmother    • Alcohol abuse Maternal Grandmother    • Diabetes Maternal Grandfather    • Stroke Maternal Grandfather    • Alcohol abuse Paternal Grandmother    • Cancer Paternal Grandmother         kidney & bone   • Diabetes Paternal Grandfather    • Dementia Paternal Grandfather      Juanita  reports that she has been smoking cigarettes. She has a 7.00 pack-year smoking history. She has never used smokeless tobacco. She reports current alcohol use. She reports that she does not use drugs.    I have reviewed and updated the above documentation (if necessary) including but not limited to chief complaint,  "ROS, PFSH, allergies and medications        Current Outpatient Medications:   •  sertraline (ZOLOFT) 25 MG tablet, Take 2 tablets by mouth Daily., Disp: 30 tablet, Rfl: 2    OBJECTIVE:  Visit Vitals  /68 (BP Location: Right arm, Patient Position: Sitting, Cuff Size: Adult)   Pulse 78   Ht 160 cm (63\")   Wt 47.2 kg (104 lb)   SpO2 98%   BMI 18.42 kg/m²        CHARLI-7 score went from 21 to 17.    Physical Exam  Vitals and nursing note reviewed.   Constitutional:       General: She is not in acute distress.     Appearance: Normal appearance. She is not ill-appearing, toxic-appearing or diaphoretic.   HENT:      Head: Normocephalic and atraumatic.   Eyes:      General: No scleral icterus.  Neck:      Comments: No thyromegaly appreciated.  Cardiovascular:      Rate and Rhythm: Normal rate and regular rhythm.      Heart sounds: Normal heart sounds.   Pulmonary:      Effort: Pulmonary effort is normal. No respiratory distress.      Breath sounds: No wheezing.   Abdominal:      General: There is no distension.      Palpations: Abdomen is soft.      Tenderness: There is no abdominal tenderness.   Musculoskeletal:      Right lower leg: No edema.      Left lower leg: No edema.   Skin:     General: Skin is warm and dry.   Neurological:      Mental Status: She is alert and oriented to person, place, and time.   Psychiatric:         Mood and Affect: Mood normal.         Behavior: Behavior normal.         Thought Content: Thought content normal.         Judgment: Judgment normal.         [facility] [place]    Assessment/Plan    Diagnoses and all orders for this visit:    1. Anxiety and depression  -     Discontinue: sertraline (ZOLOFT) 25 MG tablet; Take 1 tablet by mouth Daily.  Dispense: 30 tablet; Refill: 2  -     sertraline (ZOLOFT) 25 MG tablet; Take 2 tablets by mouth Daily.  Dispense: 30 tablet; Refill: 2      Continue with the Zoloft 50 mg daily for now.  Will let us know if she has any questions or problems with this.      Education " materials and an After Visit Summary were printed and given to the patient at discharge.  Return in about 3 months (around 9/6/2022) for Annual physical.         GUERRERO Riggins   12:02 CDT  6/6/2022

## 2022-06-15 DIAGNOSIS — F41.9 ANXIETY AND DEPRESSION: Primary | ICD-10-CM

## 2022-06-15 DIAGNOSIS — F32.A ANXIETY AND DEPRESSION: Primary | ICD-10-CM

## 2022-08-24 ENCOUNTER — TELEMEDICINE (OUTPATIENT)
Dept: PSYCHIATRY | Facility: CLINIC | Age: 32
End: 2022-08-24

## 2022-08-24 DIAGNOSIS — F41.1 GENERALIZED ANXIETY DISORDER: Primary | ICD-10-CM

## 2022-08-24 PROCEDURE — 90791 PSYCH DIAGNOSTIC EVALUATION: CPT | Performed by: COUNSELOR
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
## 2022-09-12 ENCOUNTER — OFFICE VISIT (OUTPATIENT)
Dept: INTERNAL MEDICINE | Facility: CLINIC | Age: 32
End: 2022-09-12

## 2022-09-12 ENCOUNTER — LAB (OUTPATIENT)
Dept: LAB | Facility: HOSPITAL | Age: 32
End: 2022-09-12

## 2022-09-12 VITALS
SYSTOLIC BLOOD PRESSURE: 108 MMHG | BODY MASS INDEX: 19.14 KG/M2 | HEART RATE: 96 BPM | DIASTOLIC BLOOD PRESSURE: 64 MMHG | WEIGHT: 108 LBS | HEIGHT: 63 IN | OXYGEN SATURATION: 98 %

## 2022-09-12 DIAGNOSIS — D64.9 ANEMIA, UNSPECIFIED TYPE: ICD-10-CM

## 2022-09-12 DIAGNOSIS — G89.29 CHRONIC BILATERAL LOW BACK PAIN WITHOUT SCIATICA: ICD-10-CM

## 2022-09-12 DIAGNOSIS — M54.50 CHRONIC BILATERAL LOW BACK PAIN WITHOUT SCIATICA: ICD-10-CM

## 2022-09-12 DIAGNOSIS — F17.200 TOBACCO DEPENDENCE: ICD-10-CM

## 2022-09-12 DIAGNOSIS — N76.1 CHRONIC VAGINITIS: ICD-10-CM

## 2022-09-12 DIAGNOSIS — R09.82 POST-NASAL DRAINAGE: ICD-10-CM

## 2022-09-12 DIAGNOSIS — Z00.00 ENCOUNTER FOR ANNUAL PHYSICAL EXAM: ICD-10-CM

## 2022-09-12 DIAGNOSIS — R49.0 HOARSENESS: ICD-10-CM

## 2022-09-12 DIAGNOSIS — Z13.6 HYPERTENSION SCREEN: ICD-10-CM

## 2022-09-12 DIAGNOSIS — F41.9 ANXIETY AND DEPRESSION: ICD-10-CM

## 2022-09-12 DIAGNOSIS — R10.11 RIGHT UPPER QUADRANT ABDOMINAL PAIN: ICD-10-CM

## 2022-09-12 DIAGNOSIS — Z00.00 ENCOUNTER FOR ANNUAL PHYSICAL EXAM: Primary | ICD-10-CM

## 2022-09-12 DIAGNOSIS — F32.A ANXIETY AND DEPRESSION: ICD-10-CM

## 2022-09-12 LAB
BASOPHILS # BLD AUTO: 0.04 10*3/MM3 (ref 0–0.2)
BASOPHILS NFR BLD AUTO: 0.4 % (ref 0–1.5)
CHOLEST SERPL-MCNC: 177 MG/DL (ref 0–200)
DEPRECATED RDW RBC AUTO: 47.5 FL (ref 37–54)
EOSINOPHIL # BLD AUTO: 0.34 10*3/MM3 (ref 0–0.4)
EOSINOPHIL NFR BLD AUTO: 3.3 % (ref 0.3–6.2)
ERYTHROCYTE [DISTWIDTH] IN BLOOD BY AUTOMATED COUNT: 14 % (ref 12.3–15.4)
HBA1C MFR BLD: 5.3 % (ref 4.8–5.6)
HCT VFR BLD AUTO: 40.6 % (ref 34–46.6)
HDLC SERPL-MCNC: 55 MG/DL (ref 40–60)
HGB BLD-MCNC: 12.4 G/DL (ref 12–15.9)
IMM GRANULOCYTES # BLD AUTO: 0.04 10*3/MM3 (ref 0–0.05)
IMM GRANULOCYTES NFR BLD AUTO: 0.4 % (ref 0–0.5)
LDLC SERPL CALC-MCNC: 101 MG/DL (ref 0–100)
LDLC/HDLC SERPL: 1.79 {RATIO}
LYMPHOCYTES # BLD AUTO: 2.53 10*3/MM3 (ref 0.7–3.1)
LYMPHOCYTES NFR BLD AUTO: 24.7 % (ref 19.6–45.3)
MCH RBC QN AUTO: 28.3 PG (ref 26.6–33)
MCHC RBC AUTO-ENTMCNC: 30.5 G/DL (ref 31.5–35.7)
MCV RBC AUTO: 92.7 FL (ref 79–97)
MONOCYTES # BLD AUTO: 0.39 10*3/MM3 (ref 0.1–0.9)
MONOCYTES NFR BLD AUTO: 3.8 % (ref 5–12)
NEUTROPHILS NFR BLD AUTO: 6.92 10*3/MM3 (ref 1.7–7)
NEUTROPHILS NFR BLD AUTO: 67.4 % (ref 42.7–76)
NRBC BLD AUTO-RTO: 0 /100 WBC (ref 0–0.2)
PLATELET # BLD AUTO: 266 10*3/MM3 (ref 140–450)
PMV BLD AUTO: 10.4 FL (ref 6–12)
RBC # BLD AUTO: 4.38 10*6/MM3 (ref 3.77–5.28)
TRIGL SERPL-MCNC: 119 MG/DL (ref 0–150)
VLDLC SERPL-MCNC: 21 MG/DL (ref 5–40)
WBC NRBC COR # BLD: 10.26 10*3/MM3 (ref 3.4–10.8)

## 2022-09-12 PROCEDURE — 2014F MENTAL STATUS ASSESS: CPT | Performed by: NURSE PRACTITIONER

## 2022-09-12 PROCEDURE — 36415 COLL VENOUS BLD VENIPUNCTURE: CPT

## 2022-09-12 PROCEDURE — 83036 HEMOGLOBIN GLYCOSYLATED A1C: CPT

## 2022-09-12 PROCEDURE — 80061 LIPID PANEL: CPT

## 2022-09-12 PROCEDURE — 3008F BODY MASS INDEX DOCD: CPT | Performed by: NURSE PRACTITIONER

## 2022-09-12 PROCEDURE — 99395 PREV VISIT EST AGE 18-39: CPT | Performed by: NURSE PRACTITIONER

## 2022-09-12 PROCEDURE — 85025 COMPLETE CBC W/AUTO DIFF WBC: CPT

## 2022-09-12 RX ORDER — NICOTINE 21 MG/24HR
1 PATCH, TRANSDERMAL 24 HOURS TRANSDERMAL EVERY 24 HOURS
Qty: 30 PATCH | Refills: 0 | Status: SHIPPED | OUTPATIENT
Start: 2022-09-12 | End: 2022-11-14

## 2022-09-12 RX ORDER — FLUTICASONE PROPIONATE 50 MCG
2 SPRAY, SUSPENSION (ML) NASAL DAILY
Qty: 16 G | Refills: 0 | Status: SHIPPED | OUTPATIENT
Start: 2022-09-12 | End: 2022-10-10 | Stop reason: SDUPTHER

## 2022-09-12 NOTE — PROGRESS NOTES
Chief Complaint   Patient presents with   • Annual Exam         History:  Juanita Cedillo is a 31 y.o. female who presents today for evaluation of the above problems.      Here for annual exam.  Labs reviewed, hasn't had lipid screen or Hgb A1C.      Pap a few years ago by Dr. Navas.  Has had vaginal itching and burning since the birth of her last child 8 months ago.  GYN had given her yeast infection cream, but not working. There is no odor.  No chance of STI.  Would like referral to GYN here. She had tubes removed.    Eye exam regularly, wears contacts.  Has dentist, no current problems.    Does not want Covid vaccine.  Would consider Pneumovax and has been a smoker since age 16 (15 years now).  We addressed several issues today, and she left before getting the pneumonia shot, but she knows we have it here.    As above, smoker for 15 years, about 1/2 PPD.  She would be willing to try patches to quit and is excited about this.    Low back pain, chronic, sees chiropractor.  Has 4 small children and is always bending, carrying children, etc. Takes Tylenol as needed, and this helps.    Hoarseness off and on, voice will just go out, for several months at least.  Does at times feel she needs to clear mucous from throat.  No reflux symptoms.    Right-sided abdominal pain off and on over the last 6 months.  Puts her hand on area from hip to ribs on right as where it hurts. Hurts so bad she doubles over in pain.  Scared it will happen again.  No urinary symptoms, GI symptoms.    History of anemia with heavy periods.  Now that she is not taking birth control, she does note she feels slightly lightheaded around period.      Is doing well on Zoloft 50 mg daily.  Seeing Behavioral Health thru Zoom regularly and is very happy with this.        Social History     Socioeconomic History   • Marital status:    Tobacco Use   • Smoking status: Current Every Day Smoker     Packs/day: 0.50     Years: 14.00     Pack years: 7.00      Types: Cigarettes   • Smokeless tobacco: Never Used   Vaping Use   • Vaping Use: Former   Substance and Sexual Activity   • Alcohol use: Yes     Alcohol/week: 1.0 standard drink     Types: 1 Drinks containing 0.5 oz of alcohol per week     Comment: once a week   • Drug use: No   • Sexual activity: Yes     Partners: Male     Birth control/protection: None       PHQ-9 Depression Screening  Little interest or pleasure in doing things? 1-->several days   Feeling down, depressed, or hopeless? 1-->several days   Trouble falling or staying asleep, or sleeping too much?     Feeling tired or having little energy?     Poor appetite or overeating?     Feeling bad about yourself - or that you are a failure or have let yourself or your family down?     Trouble concentrating on things, such as reading the newspaper or watching television?     Moving or speaking so slowly that other people could have noticed? Or the opposite - being so fidgety or restless that you have been moving around a lot more than usual?     Thoughts that you would be better off dead, or of hurting yourself in some way?     PHQ-9 Total Score 2   If you checked off any problems, how difficult have these problems made it for you to do your work, take care of things at home, or get along with other people?         PHQ-9 Total Score: 2    ROS:  Review of Systems   Constitutional: Negative for activity change, fatigue and fever.   HENT: Positive for postnasal drip. Negative for congestion.    Eyes: Negative for visual disturbance.   Respiratory: Negative for cough and shortness of breath.    Cardiovascular: Negative for leg swelling.   Gastrointestinal: Positive for abdominal pain (as above). Negative for blood in stool.   Genitourinary: Positive for vaginal pain (burning). Negative for difficulty urinating.   Musculoskeletal: Positive for back pain.   Allergic/Immunologic: Negative for food allergies.   Neurological: Positive for dizziness (occasional).  Negative for syncope.   Hematological: Negative for adenopathy.   Psychiatric/Behavioral:        Stable anxiety and depression, seeing behavioral health         Current Outpatient Medications:   •  sertraline (Zoloft) 50 MG tablet, Take 1 tablet by mouth Daily., Disp: 30 tablet, Rfl: 5  •  fluticasone (Flonase) 50 MCG/ACT nasal spray, 2 sprays into the nostril(s) as directed by provider Daily., Disp: 16 g, Rfl: 0  •  nicotine (Nicoderm CQ) 14 MG/24HR patch, Place 1 patch on the skin as directed by provider Daily., Disp: 30 patch, Rfl: 0    Lab Results   Component Value Date    GLUCOSE 86 12/17/2021    BUN 7 12/17/2021    CREATININE 0.69 12/17/2021    EGFRIFNONA 101 12/17/2021    BCR 10.1 12/17/2021    K 4.0 12/17/2021    CO2 26.0 12/17/2021    CALCIUM 9.5 12/17/2021    ALBUMIN 4.50 12/17/2021    AST 16 12/17/2021    ALT 11 12/17/2021       WBC   Date Value Ref Range Status   12/17/2021 7.25 3.40 - 10.80 10*3/mm3 Final   04/24/2018 5.7 4.8 - 10.8 K/uL Final     RBC   Date Value Ref Range Status   12/17/2021 4.47 3.77 - 5.28 10*6/mm3 Final   04/24/2018 4.54 4.20 - 5.40 M/uL Final     Hemoglobin   Date Value Ref Range Status   12/17/2021 12.6 12.0 - 15.9 g/dL Final   04/24/2018 13.5 12.0 - 16.0 g/dL Final     Hematocrit   Date Value Ref Range Status   12/17/2021 40.6 34.0 - 46.6 % Final   04/24/2018 41.7 37.0 - 47.0 % Final     MCV   Date Value Ref Range Status   12/17/2021 90.8 79.0 - 97.0 fL Final   04/24/2018 91.9 81.0 - 99.0 fL Final     MCH   Date Value Ref Range Status   12/17/2021 28.2 26.6 - 33.0 pg Final   04/24/2018 29.7 27.0 - 31.0 pg Final     MCHC   Date Value Ref Range Status   12/17/2021 31.0 (L) 31.5 - 35.7 g/dL Final   04/24/2018 32.4 (L) 33.0 - 37.0 g/dL Final     RDW   Date Value Ref Range Status   12/17/2021 13.4 12.3 - 15.4 % Final   04/24/2018 13.2 11.5 - 14.5 % Final     RDW-SD   Date Value Ref Range Status   12/17/2021 44.8 37.0 - 54.0 fl Final     MPV   Date Value Ref Range Status   12/17/2021  10.3 6.0 - 12.0 fL Final   04/24/2018 10.0 9.4 - 12.3 fL Final     Platelets   Date Value Ref Range Status   12/17/2021 373 140 - 450 10*3/mm3 Final   04/24/2018 325 130 - 400 K/uL Final     Neutrophil Rel %   Date Value Ref Range Status   04/24/2018 50.5 50.0 - 65.0 % Final     Neutrophil %   Date Value Ref Range Status   12/17/2021 61.0 42.7 - 76.0 % Final     Lymphocyte Rel %   Date Value Ref Range Status   04/24/2018 39.8 20.0 - 40.0 % Final     Lymphocyte %   Date Value Ref Range Status   12/17/2021 29.1 19.6 - 45.3 % Final     Monocyte Rel %   Date Value Ref Range Status   04/24/2018 4.9 0.0 - 10.0 % Final     Monocyte %   Date Value Ref Range Status   12/17/2021 4.6 (L) 5.0 - 12.0 % Final     Eosinophil Rel %   Date Value Ref Range Status   04/24/2018 3.8 0.0 - 5.0 % Final     Eosinophil %   Date Value Ref Range Status   12/17/2021 4.6 0.3 - 6.2 % Final     Basophil Rel %   Date Value Ref Range Status   04/24/2018 0.7 0.0 - 1.0 % Final     Basophil %   Date Value Ref Range Status   12/17/2021 0.6 0.0 - 1.5 % Final     Immature Grans %   Date Value Ref Range Status   12/17/2021 0.1 0.0 - 0.5 % Final     Neutrophils Absolute   Date Value Ref Range Status   04/24/2018 2.9 1.5 - 7.5 K/uL Final     Neutrophils, Absolute   Date Value Ref Range Status   12/17/2021 4.43 1.70 - 7.00 10*3/mm3 Final     Lymphocytes Absolute   Date Value Ref Range Status   04/24/2018 2.3 1.1 - 4.5 K/uL Final     Lymphocytes, Absolute   Date Value Ref Range Status   12/17/2021 2.11 0.70 - 3.10 10*3/mm3 Final     Monocytes Absolute   Date Value Ref Range Status   04/24/2018 0.30 0.00 - 0.90 K/uL Final     Monocytes, Absolute   Date Value Ref Range Status   12/17/2021 0.33 0.10 - 0.90 10*3/mm3 Final     Eosinophils Absolute   Date Value Ref Range Status   04/24/2018 0.20 0.00 - 0.60 K/uL Final     Eosinophils, Absolute   Date Value Ref Range Status   12/17/2021 0.33 0.00 - 0.40 10*3/mm3 Final     Basophils Absolute   Date Value Ref Range  "Status   04/24/2018 0.00 0.00 - 0.20 K/uL Final     Basophils, Absolute   Date Value Ref Range Status   12/17/2021 0.04 0.00 - 0.20 10*3/mm3 Final     Immature Grans, Absolute   Date Value Ref Range Status   12/17/2021 0.01 0.00 - 0.05 10*3/mm3 Final     nRBC   Date Value Ref Range Status   12/17/2021 0.0 0.0 - 0.2 /100 WBC Final         OBJECTIVE:  Visit Vitals  /64 (BP Location: Right arm, Patient Position: Sitting, Cuff Size: Adult)   Pulse 96   Ht 160 cm (63\")   Wt 49 kg (108 lb)   SpO2 98%   BMI 19.13 kg/m²      Physical Exam  Vitals and nursing note reviewed.   Constitutional:       General: She is not in acute distress.     Appearance: Normal appearance. She is normal weight. She is not ill-appearing, toxic-appearing or diaphoretic.   HENT:      Head: Normocephalic and atraumatic.      Right Ear: Tympanic membrane, ear canal and external ear normal. There is no impacted cerumen.      Left Ear: Tympanic membrane, ear canal and external ear normal. There is no impacted cerumen.      Nose: Nose normal. No congestion or rhinorrhea.      Mouth/Throat:      Mouth: Mucous membranes are moist.      Pharynx: No oropharyngeal exudate or posterior oropharyngeal erythema.      Comments: Thin film of post-nasal drainage noted both sides posterior pharynx.  Eyes:      General: No scleral icterus.        Right eye: No discharge.         Left eye: No discharge.      Extraocular Movements: Extraocular movements intact.      Conjunctiva/sclera: Conjunctivae normal.      Pupils: Pupils are equal, round, and reactive to light.   Neck:      Vascular: No carotid bruit.      Comments: No thyromegaly or mass appreciated.    Cardiovascular:      Rate and Rhythm: Normal rate and regular rhythm.      Heart sounds: Normal heart sounds. No murmur heard.  Pulmonary:      Effort: Pulmonary effort is normal. No respiratory distress.      Breath sounds: Normal breath sounds. No wheezing.   Abdominal:      General: There is no " distension.      Palpations: Abdomen is soft. There is no mass.      Tenderness: There is abdominal tenderness (RUQ tenderness and RLQ tenderness). There is no right CVA tenderness, left CVA tenderness, guarding or rebound.      Hernia: No hernia is present.   Musculoskeletal:         General: Tenderness (bilateral low back tenderness in paraspinous musculature) present.      Cervical back: Neck supple. No tenderness.      Right lower leg: No edema.      Left lower leg: No edema.   Lymphadenopathy:      Cervical: No cervical adenopathy.   Skin:     General: Skin is warm and dry.   Neurological:      Mental Status: She is alert and oriented to person, place, and time.      Gait: Gait normal.   Psychiatric:         Mood and Affect: Mood normal.         Behavior: Behavior normal.         Thought Content: Thought content normal.         Judgment: Judgment normal.         Assessment/Plan    Diagnoses and all orders for this visit:    1. Encounter for annual physical exam (Primary)  -     Hemoglobin A1c; Future  -     Lipid panel; Future    2. Anxiety and depression  -     sertraline (Zoloft) 50 MG tablet; Take 1 tablet by mouth Daily.  Dispense: 30 tablet; Refill: 5    3. Post-nasal drainage  -     fluticasone (Flonase) 50 MCG/ACT nasal spray; 2 sprays into the nostril(s) as directed by provider Daily.  Dispense: 16 g; Refill: 0    4. Tobacco dependence  -     nicotine (Nicoderm CQ) 14 MG/24HR patch; Place 1 patch on the skin as directed by provider Daily.  Dispense: 30 patch; Refill: 0    5. Chronic vaginitis  -     Ambulatory Referral to Obstetrics / Gynecology    6. Anemia, unspecified type  -     CBC w AUTO Differential; Future    7. Right upper quadrant abdominal pain  -     CT Abdomen Pelvis Without Contrast; Future    8. Hoarseness    9. Hypertension screen    10. Chronic bilateral low back pain without sciatica    Labs and refills as above.  We can do pneumovax any time she would like.       Counseling/Anticipatory Guidance Discussed: nutrition, family planning/contraception, physical activity, healthy weight, injury prevention, misuse of tobacco, alcohol and drugs, sexual behavior and STDs, dental health, mental health, immunizations and breast cancer and self breast exams Discussed importance of adequate calcium and Vitamin D intake for bone health.    Juanita Cedillo  reports that she has been smoking cigarettes. She has a 7.00 pack-year smoking history. She has never used smokeless tobacco.. I have educated her on the risk of diseases from using tobacco products such as cancer, COPD and heart disease.     I advised her to quit and she is willing to quit. We have discussed the following method/s for tobacco cessation:  Counseling Prescription Medicaiton.  Together we have set a quit date for when she gets the patches.  She will follow up with me in 1 month or sooner to check on her progress. I will be happy to send in the 7 mg patches next if she has done well with the 14 mg.  I asked her to call or message and let us know.    I spent 3  minutes counseling the patient.    Hypertension screen negative.  Advised to get 64 ounces of water daily in addition to other fluids she is drinking.    Apart from the annual wellness exam, we discussed several issues today, including vaginitis, low back pain, abdominal pain, hoarseness.    Refer to GYN for vaginal irritation lasting at least 8 months.    We can order low back xrays any time she would like.  For now, she is going to continue to follow up with chiropractor.    Check CT abdomen for recurring right-side abdominal pain.  I asked her to let us know if this continues or if other GI symptoms develop.    For hoarseness, I would like to see how she does with the Flonase and with smoking cessation.  If hoarseness persists, will refer to ENT for further evaluation. No history of GERD symptoms at this time, so will not try PPI at this time.         BMI is  within normal parameters. No other follow-up for BMI required.      Return in about 6 months (around 3/12/2023) for oarase.    Patient was given instructions and counseling regarding his/her condition or for health maintenance advice. Please see specific information pulled into the AVS if appropriate.     GUERRERO Riggins  09:20 CDT  9/12/2022

## 2022-09-16 ENCOUNTER — HOSPITAL ENCOUNTER (OUTPATIENT)
Dept: CT IMAGING | Facility: HOSPITAL | Age: 32
Discharge: HOME | End: 2022-09-16
Admitting: NURSE PRACTITIONER

## 2022-09-16 DIAGNOSIS — R10.11 RIGHT UPPER QUADRANT ABDOMINAL PAIN: ICD-10-CM

## 2022-09-16 PROCEDURE — 74176 CT ABD & PELVIS W/O CONTRAST: CPT

## 2022-09-19 DIAGNOSIS — M25.551 RIGHT HIP PAIN: Primary | ICD-10-CM

## 2022-09-22 ENCOUNTER — HOSPITAL ENCOUNTER (OUTPATIENT)
Dept: GENERAL RADIOLOGY | Facility: HOSPITAL | Age: 32
Discharge: HOME | End: 2022-09-22
Admitting: NURSE PRACTITIONER

## 2022-09-22 DIAGNOSIS — M25.551 RIGHT HIP PAIN: ICD-10-CM

## 2022-09-22 PROCEDURE — 73502 X-RAY EXAM HIP UNI 2-3 VIEWS: CPT

## 2022-10-10 DIAGNOSIS — R09.82 POST-NASAL DRAINAGE: ICD-10-CM

## 2022-10-10 RX ORDER — FLUTICASONE PROPIONATE 50 MCG
2 SPRAY, SUSPENSION (ML) NASAL DAILY
Qty: 16 G | Refills: 0 | Status: SHIPPED | OUTPATIENT
Start: 2022-10-10 | End: 2023-03-14

## 2022-10-13 ENCOUNTER — OFFICE VISIT (OUTPATIENT)
Dept: OBSTETRICS AND GYNECOLOGY | Facility: CLINIC | Age: 32
End: 2022-10-13

## 2022-10-13 ENCOUNTER — TELEMEDICINE (OUTPATIENT)
Dept: PSYCHIATRY | Facility: CLINIC | Age: 32
End: 2022-10-13

## 2022-10-13 VITALS
HEIGHT: 63 IN | WEIGHT: 107 LBS | DIASTOLIC BLOOD PRESSURE: 68 MMHG | BODY MASS INDEX: 18.96 KG/M2 | SYSTOLIC BLOOD PRESSURE: 122 MMHG

## 2022-10-13 DIAGNOSIS — F33.1 MODERATE EPISODE OF RECURRENT MAJOR DEPRESSIVE DISORDER: ICD-10-CM

## 2022-10-13 DIAGNOSIS — L29.2 ITCHING OF VULVA: ICD-10-CM

## 2022-10-13 DIAGNOSIS — Z01.419 WOMEN'S ANNUAL ROUTINE GYNECOLOGICAL EXAMINATION: Primary | ICD-10-CM

## 2022-10-13 DIAGNOSIS — F41.1 GENERALIZED ANXIETY DISORDER: Primary | ICD-10-CM

## 2022-10-13 PROCEDURE — 87563 M. GENITALIUM AMP PROBE: CPT | Performed by: NURSE PRACTITIONER

## 2022-10-13 PROCEDURE — 87661 TRICHOMONAS VAGINALIS AMPLIF: CPT | Performed by: NURSE PRACTITIONER

## 2022-10-13 PROCEDURE — 87481 CANDIDA DNA AMP PROBE: CPT | Performed by: NURSE PRACTITIONER

## 2022-10-13 PROCEDURE — 87798 DETECT AGENT NOS DNA AMP: CPT | Performed by: NURSE PRACTITIONER

## 2022-10-13 PROCEDURE — 87512 GARDNER VAG DNA QUANT: CPT | Performed by: NURSE PRACTITIONER

## 2022-10-13 PROCEDURE — 87624 HPV HI-RISK TYP POOLED RSLT: CPT | Performed by: NURSE PRACTITIONER

## 2022-10-13 PROCEDURE — 90832 PSYTX W PT 30 MINUTES: CPT | Performed by: COUNSELOR

## 2022-10-13 PROCEDURE — 2014F MENTAL STATUS ASSESS: CPT | Performed by: NURSE PRACTITIONER

## 2022-10-13 PROCEDURE — 3008F BODY MASS INDEX DOCD: CPT | Performed by: NURSE PRACTITIONER

## 2022-10-13 PROCEDURE — 99395 PREV VISIT EST AGE 18-39: CPT | Performed by: NURSE PRACTITIONER

## 2022-10-13 PROCEDURE — G0123 SCREEN CERV/VAG THIN LAYER: HCPCS | Performed by: NURSE PRACTITIONER

## 2022-10-13 RX ORDER — ESTRADIOL 0.1 MG/G
2 CREAM VAGINAL DAILY
Qty: 42.5 G | Refills: 12 | Status: SHIPPED | OUTPATIENT
Start: 2022-10-13 | End: 2023-03-14

## 2022-10-13 NOTE — PROGRESS NOTES
Date: October 13, 2022  Time In: 11:00 am   Time Out: 11:35 am   This provider is located at the [facility] (through [facility]), [address], [place] [address] using a secure [employer] Video Visit through [employer]. Patient is being seen remotely via telehealth at home address in [place] and stated they are in a secure environment for this session. The patient's condition being diagnosed/treated is appropriate for telemedicine. The provider identified herself as well as her credentials. The patient, and/or patients guardian, consent to be seen remotely, and when consent is given they understand that the consent allows for patient identifiable information to be sent to a third party as needed. They may refuse to be seen remotely at any time. The electronic data is encrypted and password protected, and the patient and/or guardian has been advised of the potential risks to privacy not withstanding such measures.     You have chosen to receive care through a telehealth visit.  Do you consent to use a video/audio connection for your medical care today? Yes    PROGRESS NOTE  Data:  Juanita Cedillo is a 31 y.o. female who presents today for follow up.  Patient reported that she has been okay since last contact. Patient reported that with her medication that she has good and bad days. Patient reported that last week she didn't want to get out of bed and didn't care. Patient reported that she feels that she should be happy. Therapist discussed with patient regarding depressive symptoms. Patient noted that when she lays in bed she is exhausted and cannot stop her mind to go to sleep.  Patient reported that she has to get up and do things for her kids and assure their needs are met. Patient and therapist reviewed patient completed questionnaires. Patient reported that she had a panic attack over the weekend. Patient reported difficult breathing, chest feeling hot. Patient discussed  that she had a thought the previous week of wanting to hurt that she always tries to push the thought out. Patient denied having a plan just a thought.  Patient reported that she would like to work or go to school but that she feels like it is not enough to be a mom. Patient reported that she wants to give her all in areas and feels that it would be difficult if she tried to do it all. Patient participated in the collaborative development of her treatment plan.     Chief Complaint: anxiety, patient reported having a difficult time last week with depressive symptoms, panic attack    History of Present Illness: patient reported noticing depressive symptoms following being pregnant      Clinical Maneuvering/Intervention: treatment planning     (Scales based on 0 - 10 with 10 being the worst)  Depression:   6-7 Anxiety:  6-7        Assisted patient in processing above session content; acknowledged and normalized patient’s thoughts, feelings, and concerns.  Rationalized patient thought process regarding feeling bad about being short and quick tempered.  Discussed triggers associated with patient's anxiety and depressive symptoms.  Also discussed coping skills for patient to implement such as spending time for self and being able to engage with her .    Allowed patient to freely discuss issues without interruption or judgment. Provided safe, confidential environment to facilitate the development of positive therapeutic relationship and encourage open, honest communication. If risk factors which would indicate the need for higher level of care including thoughts to harm self or others and/or self-harming behavior then patient is encouraged patient to contact this office, call 911, or present to the nearest emergency room should any of these events occur. Patient adamantly and convincingly denies current suicidal or homicidal ideation or perceptual disturbance.    Assessment:   Assessment   Patient appears to  maintain relative stability as compared to their baseline.  However, patient continues to struggle with anxiety which continues to cause impairment in important areas of functioning.  A result, they can be reasonably expected to continue to benefit from treatment and would likely be at increased risk for decompensation otherwise.    Mental Status Exam:   Hygiene:   good  Cooperation:  Cooperative  Eye Contact:  Good  Psychomotor Behavior:  Restless  Affect:  Full range  Mood: normal and anxious  Speech:  Normal and Rapid  Thought Process:  Patient is able to stay on topic but at times there can be fleeting thoughts  Thought Content:  Normal  Suicidal:  None and Suicidal Ideation   Homicidal:  None  Hallucinations:  None  Delusion:  None  Memory:  Intact  Orientation:  Person, Place, Time and Situation  Reliability:  fair  Insight:  Good and Fair  Judgement:  Fair  Impulse Control:  Good and Fair  Physical/Medical Issues:  No        Patient's Support Network Includes:  , children and mother    Functional Status: Moderate impairment     Progress toward goal: Not at goal    Prognosis: Fair with Ongoing Treatment          Plan:  Patient will continue in individual outpatient therapy with focus on improved functioning and coping skills, maintaining stability, and avoiding decompensation and the need for higher level of care.    Patient will adhere to medication regimen as prescribed and report any side effects. Patient will contact this office, call 911 or present to the nearest emergency room should suicidal or homicidal ideations occur. Provide Cognitive Behavioral Therapy and Solution Focused Therapy to improve functioning, maintain stability, and avoid decompensation and the need for higher level of care.     Return in about 2 weeks, or earlier if symptoms worsen or fail to improve.           VISIT DIAGNOSIS:     ICD-10-CM ICD-9-CM   1. Generalized anxiety disorder  F41.1 300.02   2. Moderate episode of  recurrent major depressive disorder (HCC)  F33.1 296.32             This document has been electronically signed by LUZ ELENA Aguilera  October 13, 2022 11:12 EDT      Part of this note may be an electronic transcription/translation of spoken language to printed text using the Dragon Dictation System.

## 2022-10-13 NOTE — PROGRESS NOTES
Chief Complaint   Patient presents with   • Gynecologic Exam     Patient is new to our office and here today for a GYN exam and pap.  She also complains of chronic vaginitis - external and internal - since giving birth.  She has tried OTC yeast treatments, no odor and no discharge.  She has itched to the point of making herself bleed on occasion.  Denies pelvic pain or any other problems.  Denies exposure to STD (same partner for 10+ years).  She does have many depressive thoughts and does see a therapist, but may need medication changes with therapist.   • Vaginal Itching       History:  Juanita Cedillo is a 31 y.o. female who presents today for follow-up for evaluation of the above:    HPI      Juanita Cedillo is a 31 y.o. female ,  who comes to the office today for annual GYN examination. Last menstrual period was 10/01/2022  and her last Pap smear was 2020, and was normal. She has no history of cervical dysplasia. She c/o chronic vaginal itching.  This has been present since she had her daughter who is a-year-old.  She has tried over-the-counter Monistat which will improve symptoms short-term but will restart. She has seen Dr. Navas in the past and was told that she did not have a yeast infection.  He was recommended she use hydrocortisone for itching.  She states this did not improve her itching symptoms.  Her medical history is reviewed.   She denies pain with intercourse          ROS:  Review of Systems   Constitutional: Negative for fatigue and unexpected weight change.   HENT: Negative.    Eyes: Negative.    Respiratory: Negative.    Cardiovascular: Negative.    Gastrointestinal: Negative for abdominal pain, constipation and diarrhea.   Endocrine: Negative.    Genitourinary: Positive for vaginal pain (itching). Negative for difficulty urinating, dyspareunia, genital sores, menstrual problem, pelvic pain, vaginal bleeding and vaginal discharge.   Musculoskeletal: Negative.    Skin: Negative.   "  Neurological: Negative.    Psychiatric/Behavioral: Negative.        Ms. Cedillo  reports that she has been smoking cigarettes. She has a 3.50 pack-year smoking history. She has never used smokeless tobacco. She reports current alcohol use of about 1.0 standard drink per week. She reports that she does not use drugs.      Current Outpatient Medications:   •  fluticasone (Flonase) 50 MCG/ACT nasal spray, 2 sprays into the nostril(s) as directed by provider Daily., Disp: 16 g, Rfl: 0  •  nicotine (Nicoderm CQ) 14 MG/24HR patch, Place 1 patch on the skin as directed by provider Daily., Disp: 30 patch, Rfl: 0  •  sertraline (Zoloft) 50 MG tablet, Take 1 tablet by mouth Daily., Disp: 30 tablet, Rfl: 5  •  estradiol (ESTRACE VAGINAL) 0.1 MG/GM vaginal cream, Insert 2 g into the vagina Daily., Disp: 42.5 g, Rfl: 12      OBJECTIVE:  /68   Ht 160 cm (63\")   Wt 48.5 kg (107 lb)   LMP 10/01/2022 (Exact Date)   BMI 18.95 kg/m²    Physical Exam  Exam conducted with a chaperone present.   Constitutional:       Appearance: She is well-developed.   HENT:      Head: Normocephalic and atraumatic.   Eyes:      General: Lids are normal.      Conjunctiva/sclera: Conjunctivae normal.      Pupils: Pupils are equal, round, and reactive to light.   Neck:      Thyroid: No thyromegaly.   Cardiovascular:      Rate and Rhythm: Normal rate and regular rhythm.      Heart sounds: Normal heart sounds.   Pulmonary:      Effort: Pulmonary effort is normal.      Breath sounds: Normal breath sounds.   Chest:   Breasts:     Breasts are symmetrical.      Right: No inverted nipple, mass, nipple discharge, skin change or tenderness.      Left: No inverted nipple, mass, nipple discharge, skin change or tenderness.   Abdominal:      General: Bowel sounds are normal.      Palpations: Abdomen is soft.   Genitourinary:     Exam position: Supine.      Labia:         Right: No rash, tenderness, lesion or injury.         Left: No rash, tenderness, lesion " or injury.       Vagina: No signs of injury and foreign body. Vaginal discharge (white) present. No erythema, tenderness or bleeding.      Cervix: No cervical motion tenderness, discharge or friability.      Uterus: Not deviated, not enlarged, not fixed and not tender.       Adnexa:         Right: No mass, tenderness or fullness.          Left: No mass, tenderness or fullness.        Rectum: Normal. No tenderness or external hemorrhoid.   Musculoskeletal:         General: Normal range of motion.      Cervical back: Normal range of motion and neck supple.   Skin:     General: Skin is warm and dry.   Neurological:      Mental Status: She is alert and oriented to person, place, and time.         Assessment/Plan    Diagnoses and all orders for this visit:    1. Women's annual routine gynecological examination (Primary)  -     Liquid-based Pap Smear, Screening; Future  -     Liquid-based Pap Smear, Screening  Immunizations:      - Tetanus: Unknown or >10 years ago. Recommend to have at pharmacy or on injury.      - Influenza: recommended annually      - Pneumovax:once after age 65      - Prevnar: Once after age 65      - Zostavax: Once after age 60  Colon Cancer Screening: Due at 45  Mammogram: Due at 40.  PAP: Done today  DEXA: DEXA scan at 65  COVID vaccine information is available at vaccine.[place].gov     2. Itching of vulva  -     estradiol (ESTRACE VAGINAL) 0.1 MG/GM vaginal cream; Insert 2 g into the vagina Daily.  Dispense: 42.5 g; Refill: 12    Patient's exam today is essentially normal.  Her external labia are not excoriated.  There is no redness seen.  She has very minimal normal-appearing white discharge in the vagina and on her cervix.  It is not consistent with a yeast infection.  Culture will be added to her Pap smear today to rule out BV and yeast.  We discussed starting topical estrogen cream to help rebuild tissue in the vagina and on the labia.  At this time she plans to only apply this topically to the  labia.  We may consider another trial with steroid treatment with clobetasol should the estrogen cream not provide any benefit in her culture returned negative.      We will notify her when the Pap smear results are available.  She will return in one year. In the meantime if she develops questions or problems, she will notify the office.     An After Visit Summary was printed and given to the patient at discharge.  Return in about 1 year (around 10/13/2023) for Annual physical. Sooner if problems arise.     {Review (Popup)  Instructions  Quality  Charge Capture  LOS  Follow-up  Communications :23}     Fernanda Vides APRN. 10/13/2022   Electronically Signed

## 2022-10-13 NOTE — TREATMENT PLAN
Multi-Disciplinary Problems (from Behavioral Health Treatment Plan)    Active Problems     Problem: Anxiety  Start Date: 10/13/22    Problem Details: The patient self-scales this problem as a 7 with 10 being the worst.        Goal Priority Start Date Expected End Date End Date    Patient will develop and implement behavioral and cognitive strategies to reduce anxiety and irrational fears. -- 10/13/22 -- --    Goal Details: Progress toward goal:  Not appropriate to rate progress toward goal since this is the initial treatment plan.        Goal Intervention Frequency Start Date End Date    Help patient explore past emotional issues in relation to present anxiety. Q2 Weeks 10/13/22 --    Intervention Details: Duration of treatment until until discharged.        Goal Intervention Frequency Start Date End Date    Help patient develop an awareness of their cognitive and physical responses to anxiety. Q2 Weeks 10/13/22 --    Intervention Details: Duration of treatment until until discharged.              Problem: Depression  Start Date: 10/13/22    Problem Details: The patient self-scales this problem as a 6-7 with 10 being the worst.        Goal Priority Start Date Expected End Date End Date    Patient will demonstrate the ability to initiate new constructive life skills outside of sessions on a consistent basis. -- 10/13/22 -- --    Goal Details: Progress toward goal:  Not appropriate to rate progress toward goal since this is the initial treatment plan.        Goal Intervention Frequency Start Date End Date    Assist patient in developing healthy coping strategies. Q2 Weeks 10/13/22 --    Intervention Details: Duration of treatment until until discharged.                    Reviewed By     Juanita Callejas LPCC 10/13/22 1120                 I have discussed and reviewed this treatment plan with the patient.

## 2022-10-17 LAB — HPV I/H RISK 4 DNA CVX QL PROBE+SIG AMP: NOT DETECTED

## 2022-10-18 LAB — TRICHOMONAS VAGINALIS PCR: NOT DETECTED

## 2022-10-19 LAB
GEN CATEG CVX/VAG CYTO-IMP: NORMAL
LAB AP CASE REPORT: NORMAL
LAB AP GYN ADDITIONAL INFORMATION: NORMAL
LAB AP GYN OTHER FINDINGS: NORMAL
Lab: NORMAL
PATH INTERP SPEC-IMP: NORMAL
STAT OF ADQ CVX/VAG CYTO-IMP: NORMAL

## 2022-10-27 ENCOUNTER — TELEMEDICINE (OUTPATIENT)
Dept: PSYCHIATRY | Facility: CLINIC | Age: 32
End: 2022-10-27

## 2022-10-27 DIAGNOSIS — F41.1 GENERALIZED ANXIETY DISORDER: Primary | ICD-10-CM

## 2022-10-27 DIAGNOSIS — F33.1 MODERATE EPISODE OF RECURRENT MAJOR DEPRESSIVE DISORDER: ICD-10-CM

## 2022-10-27 PROCEDURE — 90837 PSYTX W PT 60 MINUTES: CPT | Performed by: COUNSELOR

## 2022-10-27 NOTE — PROGRESS NOTES
Date: October 27, 2022  Time In: 11:00 am  Time Out: 11:54 am  This provider is located at the [facility] [address], [place] [address] using a secure [employer] Video Visit through [employer]. Patient is being seen remotely via telehealth at home address in [place] and stated they are in a secure environment for this session. The patient's condition being diagnosed/treated is appropriate for telemedicine. The provider identified herself as well as her credentials. The patient, and/or patients guardian, consent to be seen remotely, and when consent is given they understand that the consent allows for patient identifiable information to be sent to a third party as needed. They may refuse to be seen remotely at any time. The electronic data is encrypted and password protected, and the patient and/or guardian has been advised of the potential risks to privacy not withstanding such measures.     You have chosen to receive care through a telehealth visit.  Do you consent to use a video/audio connection for your medical care today? Yes    PROGRESS NOTE  Data:  Juanita Cedillo is a 31 y.o. female who presents today for follow up.  Patient reported that she has been pretty good over the last week. Patient discussed that she has to stay on top of many things (evens for her kids, school meetings, etc). Patient processed that she is avoidant of tasks. Patient shared that she doesn't want to go faraway and wants to be home, and not around a bunch of people. Patient described behaviors that are exhibited when around others socially and that align with social anxiety. Patient explored the overwhelming feeling of all the things that she does and also the need to be perfect. Therapist provided patient with psychoeducation regarding anxiety and patient was able to share about how stressors impact her overall functioning. Patient processed some about the loss of her  stepfather. Patient shared that at times it is hard to talk about him because it triggers her. Patient explained that she will shut down if he is brought up in certain situations but does not address her own feeling in order to assure others can express theirs.     Chief Complaint: anxiety, depressive symptoms    History of Present Illness: Patient was able to identify anxiety and panic attacks which began around being around a specific loved on and also the death        Clinical Maneuvering/Intervention: CBT     (Scales based on 0 - 10 with 10 being the worst)  Depression:  6-7  Anxiety:  7        Assisted patient in processing above session content; acknowledged and normalized patient’s thoughts, feelings, and concerns.  Rationalized patient thought process regarding the idea that people help her that she is considered weak. Patient stated that she feels that she needs to be perfect in everything.  Discussed triggers associated with patient's anxiety and depression.  Also discussed coping skills for patient to implement such as allowing others to help with tasks.    Allowed patient to freely discuss issues without interruption or judgment. Provided safe, confidential environment to facilitate the development of positive therapeutic relationship and encourage open, honest communication. Assisted patient in identifying risk factors which would indicate the need for higher level of care including thoughts to harm self or others and/or self-harming behavior and encouraged patient to contact this office, call 911, or present to the nearest emergency room should any of these events occur. Discussed crisis intervention services and means to access. Patient adamantly and convincingly denies current suicidal or homicidal ideation or perceptual disturbance.    Assessment:   Assessment   Patient appears to maintain relative stability as compared to their baseline.  However, patient continues to struggle with anxiety,  depression which continues to cause impairment in important areas of functioning.  A result, they can be reasonably expected to continue to benefit from treatment and would likely be at increased risk for decompensation otherwise.    Mental Status Exam:   Hygiene:   good  Cooperation:  Cooperative  Eye Contact:  Good  Psychomotor Behavior:  Appropriate  Affect:  Full range  Mood: normal  Speech:  Normal  Thought Process:  Goal directed  Thought Content:  Normal  Suicidal:  None  Homicidal:  None  Hallucinations:  None  Delusion:  None  Memory:  Intact  Orientation:  Person, Place, Time and Situation  Reliability:  good  Insight:  Fair  Judgement:  Good and Fair  Impulse Control:  Good  Physical/Medical Issues:  No        Patient's Support Network Includes:   and children    Functional Status: Moderate impairment     Progress toward goal: Not at goal    Prognosis: Good with Ongoing Treatment          Plan:  Patient will continue in individual outpatient therapy with focus on improved functioning and coping skills, maintaining stability, and avoiding decompensation and the need for higher level of care.    Patient will adhere to medication regimen as prescribed and report any side effects. Patient will contact this office, call 911 or present to the nearest emergency room should suicidal or homicidal ideations occur. Provide Cognitive Behavioral Therapy and Solution Focused Therapy to improve functioning, maintain stability, and avoid decompensation and the need for higher level of care.     Return in about 2 weeks, or earlier if symptoms worsen or fail to improve.           VISIT DIAGNOSIS:     ICD-10-CM ICD-9-CM   1. Generalized anxiety disorder  F41.1 300.02   2. Moderate episode of recurrent major depressive disorder (HCC)  F33.1 296.32             This document has been electronically signed by LUZ ELENA Aguilera  October 27, 2022 11:32 EDT      Part of this note may be an electronic  transcription/translation of spoken language to printed text using the Dragon Dictation System.

## 2022-11-10 ENCOUNTER — TELEMEDICINE (OUTPATIENT)
Dept: PSYCHIATRY | Facility: CLINIC | Age: 32
End: 2022-11-10

## 2022-11-10 DIAGNOSIS — F41.1 GENERALIZED ANXIETY DISORDER: Primary | ICD-10-CM

## 2022-11-10 DIAGNOSIS — F33.1 MODERATE EPISODE OF RECURRENT MAJOR DEPRESSIVE DISORDER: ICD-10-CM

## 2022-11-10 PROCEDURE — 90834 PSYTX W PT 45 MINUTES: CPT | Performed by: COUNSELOR

## 2022-11-10 NOTE — PROGRESS NOTES
Date: November 10, 2022  Time In: 11:00 am   Time Out: 11:45 am  This provider is located at the [facility]), [address], [place] [address] using a secure [employer] Video Visit through [employer]. Patient is being seen remotely via telehealth at home address in [place] and stated they are in a secure environment for this session. The patient's condition being diagnosed/treated is appropriate for telemedicine. The provider identified herself as well as her credentials. The patient, and/or patients guardian, consent to be seen remotely, and when consent is given they understand that the consent allows for patient identifiable information to be sent to a third party as needed. They may refuse to be seen remotely at any time. The electronic data is encrypted and password protected, and the patient and/or guardian has been advised of the potential risks to privacy not withstanding such measures.     You have chosen to receive care through a telehealth visit.  Do you consent to use a video/audio connection for your medical care today? Yes    PROGRESS NOTE  Data:  Juanita Cedillo is a 32 y.o. female who presents today for follow up.  Patient expressed that within the last two weeks things have been okay. Patient processed her things have been challenging with her middle son. Patient explored with therapist how her sons sensory disorder impacts his functioning. Patient explained that she has had more stressor recently versus being depressed. Therapist reviewed phq-9 and processed significant changes to decrease in symptoms over the last two weeks. Patient noted that she no longer feels by about herself.  Patient processed moments of anxiety while out at an restaurant. Patient shared how closeness of others impacts her anxiety. Patient dicussed with therapist about what it's like in social setting with those that feels safe verses others.   Therapist facilitated  patients processing of effective coping skills and was encouraged to practice outside of session.     Chief Complaint: anxiety, parenting challenges, social stressors     History of Present Illness: on-going       Clinical Maneuvering/Intervention: CBT    (Scales based on 0 - 10 with 10 being the worst)  Depression:  5 Anxiety:  10        Assisted patient in processing above session content; acknowledged and normalized patient’s thoughts, feelings, and concerns.  Rationalized patient thought process regarding actions which are done that benefit others but are not necessarily helpful for patient.  Discussed triggers associated with patient's anxiety.  Also discussed coping skills for patient to implement such as grounding-various forms including mental exercises, categories, 5,43,2,1 techniques.    Allowed patient to freely discuss issues without interruption or judgment. Provided safe, confidential environment to facilitate the development of positive therapeutic relationship and encourage open, honest communication. Assisted patient in identifying risk factors which would indicate the need for higher level of care including thoughts to harm self or others and/or self-harming behavior and encouraged patient to contact this office, call 911, or present to the nearest emergency room should any of these events occur. Discussed crisis intervention services and means to access. Patient adamantly and convincingly denies current suicidal or homicidal ideation or perceptual disturbance. Therapist further reviewed in depth crisis plan with patient.     Assessment:   Assessment   Patient appears to maintain relative stability as compared to their baseline.  However, patient continues to struggle with presenting symptoms related to anxiety and depression which continues to cause impairment in important areas of functioning.  A result, they can be reasonably expected to continue to benefit from treatment and would likely be at  increased risk for decompensation otherwise.    Mental Status Exam:   Hygiene:   good  Cooperation:  Cooperative  Eye Contact:  Good  Psychomotor Behavior:  Appropriate  Affect:  Full range  Mood: anxious, irritable and fluctates  Speech:  Normal  Thought Process:  Goal directed  Thought Content:  Normal  Suicidal:  Suicidal Ideation and Patient expressed that sometimes she will have a fleeting thought of having a gun to head and it's easier if she wasn't here. Reviewed crisis plan.   Homicidal:  None  Hallucinations:  None  Delusion:  None  Memory:  Intact  Orientation:  Person, Place, Time and Situation  Reliability:  good  Insight:  Good  Judgement:  Good  Impulse Control:  Good  Physical/Medical Issues:  No        Patient's Support Network Includes:   and children    Functional Status: Moderate impairment     Progress toward goal: Not at goal    Prognosis: Good with Ongoing Treatment          Plan:  Patient will continue in individual outpatient therapy with focus on improved functioning and coping skills, maintaining stability, and avoiding decompensation and the need for higher level of care.    Patient will adhere to medication regimen as prescribed and report any side effects. Patient will contact this office, call 911 or present to the nearest emergency room should suicidal or homicidal ideations occur. Provide Cognitive Behavioral Therapy and Solution Focused Therapy to improve functioning, maintain stability, and avoid decompensation and the need for higher level of care.     Return in about 4 weeks, or earlier if symptoms worsen or fail to improve.           VISIT DIAGNOSIS:     ICD-10-CM ICD-9-CM   1. Generalized anxiety disorder  F41.1 300.02   2. Moderate episode of recurrent major depressive disorder (HCC)  F33.1 296.32             This document has been electronically signed by LUZ ELENA Aguilera  November 10, 2022 11:04 EST      Part of this note may be an electronic  transcription/translation of spoken language to printed text using the Dragon Dictation System.

## 2022-11-14 PROCEDURE — 87636 SARSCOV2 & INF A&B AMP PRB: CPT | Performed by: NURSE PRACTITIONER

## 2022-12-13 ENCOUNTER — TELEMEDICINE (OUTPATIENT)
Dept: PSYCHIATRY | Facility: CLINIC | Age: 32
End: 2022-12-13

## 2022-12-13 DIAGNOSIS — F41.1 GENERALIZED ANXIETY DISORDER: Primary | ICD-10-CM

## 2022-12-13 DIAGNOSIS — F33.1 MODERATE EPISODE OF RECURRENT MAJOR DEPRESSIVE DISORDER: ICD-10-CM

## 2022-12-13 PROCEDURE — 90834 PSYTX W PT 45 MINUTES: CPT | Performed by: COUNSELOR

## 2022-12-13 NOTE — PROGRESS NOTES
Date: December 13, 2022  Time In: 10:40 am   Time Out: 11:29 am   This provider is located at the [facility]), [address], [place] [address] using a secure [employer] Video Visit through [employer]. Patient is being seen remotely via telehealth at home address in [place] and stated they are in a secure environment for this session. The patient's condition being diagnosed/treated is appropriate for telemedicine. The provider identified herself as well as her credentials. The patient, and/or patients guardian, consent to be seen remotely, and when consent is given they understand that the consent allows for patient identifiable information to be sent to a third party as needed. They may refuse to be seen remotely at any time. The electronic data is encrypted and password protected, and the patient and/or guardian has been advised of the potential risks to privacy not withstanding such measures.     You have chosen to receive care through a telehealth visit.  Do you consent to use a video/audio connection for your medical care today? Yes    PROGRESS NOTE  Data:  Juanita Cedillo is a 32 y.o. female who presents today for follow up.  Patient updated therapist on life events since last contact. Patient reported that she stopped taking her medication. Patient reported having moments of panic and also having thoughts of SI. Patient reported not having any panic attacks since that time. Patient discussed feelings of being overwhelmed in social settings. Patient reported being triggered by her sons mental health and explored feelings of guilt that she has. Patient reported that she connected her sons to treatment but also discussed the feelings that she has associated with not being sure what is occurring. Patient explored feelings regarding the upcoming holiday and events that are planned. Patient discussed stressors and triggers that may occur and therapist  facilitated processing of ways in which patient could cope.      Chief Complaint: anxiety, depression    History of Present Illness: on-going      Clinical Maneuvering/Intervention: CBT    (Scales based on 0 - 10 with 10 being the worst)  Depression:   5  Anxiety:   7        Assisted patient in processing above session content; acknowledged and normalized patient’s thoughts, feelings, and concerns.  Rationalized patient thought process regarding masking of feelings and anxiousness. Patient explored with therapist that often others will not know how she is feeling due to being able to hide it. Discussed triggers associated with patient's feelings.  Also discussed coping skills for patient to implement such as challenging irrational thoughts by use of socratic questioning. Patient is also working on taking time for self by engaging in activities of enjoyment. Patient reported to therapist in which in which she vidhya.    Allowed patient to freely discuss issues without interruption or judgment. Provided safe, confidential environment to facilitate the development of positive therapeutic relationship and encourage open, honest communication. Assisted patient in identifying risk factors which would indicate the need for higher level of care including thoughts to harm self or others and/or self-harming behavior and encouraged patient to contact this office, call 911, or present to the nearest emergency room should any of these events occur. Discussed crisis intervention services and means to access. Patient adamantly and convincingly denies current suicidal or homicidal ideation or perceptual disturbance.    Assessment:   Assessment   Patient appears to maintain relative stability as compared to their baseline.  However, patient continues to struggle with anxiety and depression which continues to cause impairment in important areas of functioning.  A result, they can be reasonably expected to continue to benefit from  treatment and would likely be at increased risk for decompensation otherwise.    Mental Status Exam:   Hygiene:   good  Cooperation:  Cooperative  Eye Contact:  Good  Psychomotor Behavior:  Appropriate  Affect:  Full range  Mood: normal  Speech:  Normal  Thought Process:  Goal directed  Thought Content:  Normal  Suicidal:  One SI thought but it was fleeting.   Homicidal:  None  Hallucinations:  None  Delusion:  None  Memory:  Intact  Orientation:  Person, Place, Time and Situation  Reliability:  good  Insight:  Good and Fair  Judgement:  Good and Fair  Impulse Control:  Fair  Physical/Medical Issues:  No        Patient's Support Network Includes:   and children    Functional Status: Moderate impairment     Progress toward goal: Not at goal    Prognosis: Good with Ongoing Treatment          Plan:  Patient will continue in individual outpatient therapy with focus on improved functioning and coping skills, maintaining stability, and avoiding decompensation and the need for higher level of care.    Patient will adhere to medication regimen as prescribed and report any side effects. Patient will contact this office, call 911 or present to the nearest emergency room should suicidal or homicidal ideations occur. Provide Cognitive Behavioral Therapy and Solution Focused Therapy to improve functioning, maintain stability, and avoid decompensation and the need for higher level of care.     Return in about 7 weeks, or earlier if symptoms worsen or fail to improve.           VISIT DIAGNOSIS:     ICD-10-CM ICD-9-CM   1. Generalized anxiety disorder  F41.1 300.02   2. Moderate episode of recurrent major depressive disorder (HCC)  F33.1 296.32             This document has been electronically signed by LUZ ELENA Aguilera  December 13, 2022 10:45 EST      Part of this note may be an electronic transcription/translation of spoken language to printed text using the Dragon Dictation System.

## 2023-02-17 ENCOUNTER — TELEMEDICINE (OUTPATIENT)
Dept: PSYCHIATRY | Facility: CLINIC | Age: 33
End: 2023-02-17
Payer: COMMERCIAL

## 2023-02-17 DIAGNOSIS — F41.1 GENERALIZED ANXIETY DISORDER: Primary | ICD-10-CM

## 2023-02-17 DIAGNOSIS — F33.1 MODERATE EPISODE OF RECURRENT MAJOR DEPRESSIVE DISORDER: ICD-10-CM

## 2023-02-17 PROCEDURE — 90834 PSYTX W PT 45 MINUTES: CPT | Performed by: COUNSELOR

## 2023-02-17 NOTE — PROGRESS NOTES
Date: February 17, 2023  Time In:  10:55 am   Time Out: 11:34 am   This provider is located at the [facility]), [address], [place] [address] using a secure [employer] Video Visit through [employer]. Patient is being seen remotely via telehealth at home address in [place] and stated they are in a secure environment for this session. The patient's condition being diagnosed/treated is appropriate for telemedicine. The provider identified herself as well as her credentials. The patient, and/or patients guardian, consent to be seen remotely, and when consent is given they understand that the consent allows for patient identifiable information to be sent to a third party as needed. They may refuse to be seen remotely at any time. The electronic data is encrypted and password protected, and the patient and/or guardian has been advised of the potential risks to privacy not withstanding such measures.     You have chosen to receive care through a telehealth visit.  Do you consent to use a video/audio connection for your medical care today? Yes    PROGRESS NOTE  Data:  Juanita Cedillo is a 32 y.o. female who presents today for follow up.      Chief Complaint: depression and anxiety     History of Present Illness: Patient reported that things are going well. Patient stated that she has been taking her medication regularly. Patient reported that her son has gotten a diagnosis and is medicated. Patient reported that she had a moment when she had some anxiousness and that she utilized positive self talk and breathing. Patient reported being proud of herself for calming down. Patient reported that she has been happier lately. Patient reported that some changes were that the kids have been helping around the house and completing chores. Patient reported that she didn't jump in to correct it. Patient has been reading more having improved focus. Patient reported she has  "also doing things just for herself.Patient reported not feeling like she has struggled with anxiety and it was situational. Patient reported that she has had more awareness of how she reacts and has been taking more pause. Patient reported she hasn't been taken as many afternoon naps which has been helping her to go to bed earlier. Patient reported some depression struggles on Monday of this week and that it was the anniversary of her stepfathers death. Patient however identified that depressive symptoms have been manageable.Patient shared that family relationships are pretty good. Patient was able to describe other ways in which successful in making changes to address symptoms.     Clinical Maneuvering/Intervention: CBT    (Scales based on 0 - 10 with 10 being the worst)  Depression:       4 Anxiety:   6-7       -                                                                                                                   Assisted patient in processing above session content; acknowledged and normalized patient’s thoughts, feelings, and concerns.  Rationalized patient thought process regarding plans for the future and things that she wants to work towards. Patient explored that she is considering going to college. Patient stated \"I want to be more\". Therapist and patient discussed the positive things that she does regarding caring for her family but also patients desire to work in an office setting or be a dental assistant  Discussed triggers associated with patient's feelings about self.  Also discussed coping skills for patient to implement such as taking time/thinking for responding. Patient also acknowledged that she has been focusing on awareness of stressors.    Allowed patient to freely discuss issues without interruption or judgment. Provided safe, confidential environment to facilitate the development of positive therapeutic relationship and encourage open, honest communication. Assisted patient in " identifying risk factors which would indicate the need for higher level of care including thoughts to harm self or others and/or self-harming behavior and encouraged patient to contact this office, call 911, or present to the nearest emergency room should any of these events occur. Discussed crisis intervention services and means to access. Patient adamantly and convincingly denies current suicidal or homicidal ideation or perceptual disturbance.    Assessment:   Assessment   Patient appears to maintain relative stability as compared to their baseline.  However, patient continues to struggle with anxiety and depression  which continues to cause impairment in important areas of functioning.  A result, they can be reasonably expected to continue to benefit from treatment and would likely be at increased risk for decompensation otherwise.    Mental Status Exam:   Hygiene:   good  Cooperation:  Cooperative  Eye Contact:  Good  Psychomotor Behavior:  Appropriate  Affect:  Full range  Mood: normal  Speech:  Normal  Thought Process:  Linear  Thought Content:  Mood congruent  Suicidal:  None  Homicidal:  None  Hallucinations:  None  Delusion:  None  Memory:  Intact  Orientation:  Person, Place, Time and Situation  Reliability:  good  Insight:  Good  Judgement:  Good  Impulse Control:  Good  Physical/Medical Issues:  No        Patient's Support Network Includes:   and children    Functional Status: Moderate impairment     Progress toward goal: Not at goal    Prognosis: Good with Ongoing Treatment          Plan:  Patient will continue in individual outpatient therapy with focus on improved functioning and coping skills, maintaining stability, and avoiding decompensation and the need for higher level of care.    Patient will adhere to medication regimen as prescribed and report any side effects. Patient will contact this office, call 911 or present to the nearest emergency room should suicidal or homicidal ideations  occur. Provide Cognitive Behavioral Therapy and Solution Focused Therapy to improve functioning, maintain stability, and avoid decompensation and the need for higher level of care.     Return in about 4 weeks, or earlier if symptoms worsen or fail to improve.           VISIT DIAGNOSIS:     ICD-10-CM ICD-9-CM   1. Generalized anxiety disorder  F41.1 300.02   2. Moderate episode of recurrent major depressive disorder (HCC)  F33.1 296.32             This document has been electronically signed by LUZ ELENA Aguilera  February 17, 2023 10:55 EST      Part of this note may be an electronic transcription/translation of spoken language to printed text using the Dragon Dictation System.

## 2023-03-14 ENCOUNTER — OFFICE VISIT (OUTPATIENT)
Dept: INTERNAL MEDICINE | Facility: CLINIC | Age: 33
End: 2023-03-14
Payer: COMMERCIAL

## 2023-03-14 VITALS
HEIGHT: 64 IN | OXYGEN SATURATION: 100 % | DIASTOLIC BLOOD PRESSURE: 62 MMHG | SYSTOLIC BLOOD PRESSURE: 104 MMHG | WEIGHT: 109 LBS | HEART RATE: 80 BPM | BODY MASS INDEX: 18.61 KG/M2

## 2023-03-14 DIAGNOSIS — F32.A ANXIETY AND DEPRESSION: Primary | ICD-10-CM

## 2023-03-14 DIAGNOSIS — F41.9 ANXIETY AND DEPRESSION: Primary | ICD-10-CM

## 2023-03-14 DIAGNOSIS — J40 BRONCHITIS: ICD-10-CM

## 2023-03-14 PROCEDURE — 1160F RVW MEDS BY RX/DR IN RCRD: CPT | Performed by: NURSE PRACTITIONER

## 2023-03-14 PROCEDURE — 1159F MED LIST DOCD IN RCRD: CPT | Performed by: NURSE PRACTITIONER

## 2023-03-14 PROCEDURE — 99214 OFFICE O/P EST MOD 30 MIN: CPT | Performed by: NURSE PRACTITIONER

## 2023-03-14 RX ORDER — GUAIFENESIN 600 MG/1
1200 TABLET, EXTENDED RELEASE ORAL 2 TIMES DAILY
Qty: 40 TABLET | Refills: 0 | Status: SHIPPED | OUTPATIENT
Start: 2023-03-14

## 2023-03-14 RX ORDER — AZITHROMYCIN 250 MG/1
TABLET, FILM COATED ORAL
Qty: 6 TABLET | Refills: 0 | Status: SHIPPED | OUTPATIENT
Start: 2023-03-14

## 2023-03-14 NOTE — PROGRESS NOTES
Chief Complaint   Patient presents with   • Anxiety   • Depression         History:  Juanita Cedillo is a 32 y.o. female who presents today for evaluation of the above problems.          Here for follow up anxiety and depression. Maintained on Zoloft 50 mg daily and seeing behavioral health for counseling regularly.  Feels the stress of caring for 4 kids under the age of 10, but overall feels stable on current dose of medication. Is finding time for herself daily and trying to relax and enjoy her life.    For one week has had cough that she can't get rid of with OTC medications.  She has thick green mucous from the chest. No fever. No sore throat or ear ache.     Anxiety      Her past medical history is significant for depression.   Depression      ROS:  Review of Systems  As above    No Known Allergies  Past Medical History:   Diagnosis Date   • Anxiety    • Depression    • Disease of thyroid gland    • Eating disorder    • Headache      Past Surgical History:   Procedure Laterality Date   • SALPINGECTOMY Bilateral 12/20/2021    Procedure: LAPAROSCOPIC BILATERAL SALPINGECTOMY;  Surgeon: Sebastian Navas MD;  Location: [facility];  Service: Obstetrics/Gynecology;  Laterality: Bilateral;   • SEPTOPLASTY  2021   • WISDOM TOOTH EXTRACTION       Family History   Problem Relation Age of Onset   • Drug abuse Father    • Alcohol abuse Father    • Depression Mother    • Alcohol abuse Mother    • Anxiety disorder Mother    • Arthritis Mother    • Miscarriages / Stillbirths Mother    • Diabetes Paternal Grandfather    • Dementia Paternal Grandfather    • Alcohol abuse Paternal Grandmother    • Cancer Paternal Grandmother         kidney & bone   • Early death Paternal Grandmother    • Kidney disease Paternal Grandmother    • Cancer Maternal Grandmother    • Alcohol abuse Maternal Grandmother    • Drug abuse Maternal Grandmother    • Hearing loss Maternal Grandmother    • Vision loss Maternal Grandmother    • Diabetes Maternal  "Grandfather    • Stroke Maternal Grandfather    • Arthritis Maternal Grandfather    • Breast cancer Neg Hx    • Ovarian cancer Neg Hx    • Uterine cancer Neg Hx    • Colon cancer Neg Hx    • Melanoma Neg Hx      Juanita  reports that she has been smoking cigarettes. She has a 7.00 pack-year smoking history. She has never used smokeless tobacco. She reports current alcohol use of about 1.0 standard drink per week. She reports that she does not use drugs.    I have reviewed and updated the above documentation (if necessary) including but not limited to chief complaint, ROS, PFSH, allergies and medications        Current Outpatient Medications:   •  sertraline (Zoloft) 50 MG tablet, Take 1 tablet by mouth Daily., Disp: 30 tablet, Rfl: 5  •  azithromycin (Zithromax Z-Harley) 250 MG tablet, Take 2 tablets by mouth on day 1, then 1 tablet daily on days 2-5, Disp: 6 tablet, Rfl: 0  •  guaiFENesin (Mucinex) 600 MG 12 hr tablet, Take 2 tablets by mouth 2 (Two) Times a Day. Take with full glass of water., Disp: 40 tablet, Rfl: 0    OBJECTIVE:  Visit Vitals  /62 (BP Location: Left arm, Patient Position: Sitting, Cuff Size: Adult)   Pulse 80   Ht 162.6 cm (64\")   Wt 49.4 kg (109 lb)   SpO2 100%   BMI 18.71 kg/m²     PHQ-9 Depression Screening  Little interest or pleasure in doing things? 0-->not at all   Feeling down, depressed, or hopeless? 0-->not at all   Trouble falling or staying asleep, or sleeping too much?     Feeling tired or having little energy?     Poor appetite or overeating?     Feeling bad about yourself - or that you are a failure or have let yourself or your family down?     Trouble concentrating on things, such as reading the newspaper or watching television?     Moving or speaking so slowly that other people could have noticed? Or the opposite - being so fidgety or restless that you have been moving around a lot more than usual?     Thoughts that you would be better off dead, or of hurting yourself in some " way?     PHQ-9 Total Score 0   If you checked off any problems, how difficult have these problems made it for you to do your work, take care of things at home, or get along with other people?         CHARLI-7 score today is 12   Physical Exam  Vitals and nursing note reviewed.   Constitutional:       General: She is not in acute distress.     Appearance: Normal appearance. She is normal weight. She is not ill-appearing, toxic-appearing or diaphoretic.   HENT:      Head: Normocephalic and atraumatic.   Eyes:      General: No scleral icterus.        Right eye: No discharge.         Left eye: No discharge.      Conjunctiva/sclera: Conjunctivae normal.   Cardiovascular:      Rate and Rhythm: Normal rate and regular rhythm.   Pulmonary:      Effort: Pulmonary effort is normal. No respiratory distress.      Breath sounds: Normal breath sounds. No stridor. No wheezing, rhonchi or rales.   Abdominal:      General: There is no distension.      Palpations: Abdomen is soft.      Tenderness: There is no abdominal tenderness.   Musculoskeletal:      Cervical back: Neck supple.      Right lower leg: No edema.      Left lower leg: No edema.   Lymphadenopathy:      Cervical: No cervical adenopathy.   Skin:     General: Skin is warm and dry.   Neurological:      Mental Status: She is alert and oriented to person, place, and time.   Psychiatric:         Mood and Affect: Mood normal.         Behavior: Behavior normal.         Thought Content: Thought content normal.         Judgment: Judgment normal.         [facility]    Assessment/Plan    Diagnoses and all orders for this visit:    1. Anxiety and depression (Primary)  -     sertraline (Zoloft) 50 MG tablet; Take 1 tablet by mouth Daily.  Dispense: 30 tablet; Refill: 5    2. Bronchitis  -     azithromycin (Zithromax Z-Harley) 250 MG tablet; Take 2 tablets by mouth on day 1, then 1 tablet daily on days 2-5  Dispense: 6 tablet; Refill: 0  -     guaiFENesin (Mucinex) 600 MG 12 hr tablet; Take 2  tablets by mouth 2 (Two) Times a Day. Take with full glass of water.  Dispense: 40 tablet; Refill: 0      Doing well on Zoloft, refill this med.    Treat bronchitis as above. Please let us know if not improving or worsening in any way.    BMI is within normal parameters. No other follow-up for BMI required.      Education materials and an After Visit Summary were printed and given to the patient at discharge.  Return in about 6 months (around 9/14/2023) for Annual physical.         GUERRERO Riggins   10:07 CDT  3/14/2023

## 2023-03-16 ENCOUNTER — TELEMEDICINE (OUTPATIENT)
Dept: PSYCHIATRY | Facility: CLINIC | Age: 33
End: 2023-03-16
Payer: COMMERCIAL

## 2023-03-16 DIAGNOSIS — F41.1 GENERALIZED ANXIETY DISORDER: Primary | ICD-10-CM

## 2023-03-16 DIAGNOSIS — F33.1 MODERATE EPISODE OF RECURRENT MAJOR DEPRESSIVE DISORDER: ICD-10-CM

## 2023-03-16 PROCEDURE — 90832 PSYTX W PT 30 MINUTES: CPT | Performed by: COUNSELOR

## 2023-03-16 NOTE — PROGRESS NOTES
Date: March 16, 2023  Time In: 10:30 am   Time Out: 10:51 am   This provider is located at the [facility] (through [facility]), [address], [place] [address] using a secure [employer] Video Visit through [employer]. Patient is being seen remotely via telehealth at home address in [place] and stated they are in a secure environment for this session. The patient's condition being diagnosed/treated is appropriate for telemedicine. The provider identified herself as well as her credentials. The patient, and/or patients guardian, consent to be seen remotely, and when consent is given they understand that the consent allows for patient identifiable information to be sent to a third party as needed. They may refuse to be seen remotely at any time. The electronic data is encrypted and password protected, and the patient and/or guardian has been advised of the potential risks to privacy not withstanding such measures.     You have chosen to receive care through a telehealth visit.  Do you consent to use a video/audio connection for your medical care today? Yes    PROGRESS NOTE  Data:  Juanita Cedillo is a 32 y.o. female who presents today for follow up    Chief Complaint: anxiety    History of Present Illness: Patient reported that things are going well. Patient reported that her anxiety has been okay. Patient reported that she noticed yesterday being overwhelmed due to multiple kids in the home,the house was loud and there was a lot going on. Patient and therapist discussed sensitivity to sound. Patient processed a recent interaction with her son where he took all the items out of his room, stated he was going to leave and was being argumentative. Patient processed setting boundaries and appropriate consequences but when that wasn't effective, contacted a neighbor who was able to support (patient reported her  as at work). Patient explored thoughts of feelings of how the  "situation was handled but felt proud that she stood her ground and did not get overwhelmed. Patient reported implement of chore chart which has been helpful with decreasing stressors. Patient reported that she has been having some anxiety when driving. Patient reported that it's often around the mall area. Patient reported that she has been going out more and doing things for herself. Patient reported that she is aware that she is working addressing anxiety before it goes up. Patient reported continuing to take medication as prescribed. Patient was praised for positive progress on management of anxiety and implementing self care.     Clinical Maneuvering/Intervention:    (Scales based on 0 - 10 with 10 being the worst)  Depression: Patient stated \" I think I'm okay\".  Anxiety: 4-5 can go higher based on situational        Assisted patient in processing above session content; acknowledged and normalized patient’s thoughts, feelings, and concerns.  Rationalized patient thought process regarding doing things for herself. Patient expressed that she has been enjoying going shopping. Patient and therapist processed social anxiety and patient reported that she no longer focuses on what others may think.   Discussed triggers associated with patient's anxiousness.  Also discussed coping skills for patient to implement such as deep breathing and grounding. Therapist discussed that present techniques may also work to assist patient with her anxiety while driving.     Allowed patient to freely discuss issues without interruption or judgment. Provided safe, confidential environment to facilitate the development of positive therapeutic relationship and encourage open, honest communication. If risk factors which would indicate the need for higher level of care including thoughts to harm self or others and/or self-harming behavior then patient is encouraged to contact this office, call 911, or present to the nearest emergency room " should any of these events occur. Discussed crisis intervention services and means to access. Patient adamantly and convincingly denies current suicidal or homicidal ideation or perceptual disturbance.    Assessment:   Assessment   Patient appears to maintain relative stability as compared to their baseline.  However, patient continues to struggle with anxiety and depression  which continues to cause impairment in important areas of functioning.  A result, they can be reasonably expected to continue to benefit from treatment and would likely be at increased risk for decompensation otherwise.    Mental Status Exam:   Hygiene:   good  Cooperation:  Cooperative  Eye Contact:  Good  Psychomotor Behavior:  Appropriate  Affect:  Full range  Mood: normal  Speech:  Rapid  Thought Process:  Goal directed and Linear  Thought Content:  Normal and Mood congruent  Suicidal:  None  Homicidal:  None  Hallucinations:  None  Delusion:  None  Memory:  Intact  Orientation:  Person, Place, Time and Situation  Reliability:  good  Insight:  Good  Judgement:  Good  Impulse Control:  Good  Physical/Medical Issues:  No        Patient's Support Network Includes:  , children and friends    Functional Status: Mild impairment     Progress toward goal: Not at goal    Prognosis: Good with Ongoing Treatment          Plan:  Patient will continue in individual outpatient therapy with focus on improved functioning and coping skills, maintaining stability, and avoiding decompensation and the need for higher level of care.    Patient will adhere to medication regimen as prescribed and report any side effects. Patient will contact this office, call 911 or present to the nearest emergency room should suicidal or homicidal ideations occur. Provide Cognitive Behavioral Therapy and Solution Focused Therapy to improve functioning, maintain stability, and avoid decompensation and the need for higher level of care.     Return in about 3 weeks, or  earlier if symptoms worsen or fail to improve.           VISIT DIAGNOSIS:     ICD-10-CM ICD-9-CM   1. Generalized anxiety disorder  F41.1 300.02   2. Moderate episode of recurrent major depressive disorder (HCC)  F33.1 296.32             This document has been electronically signed by LUZ ELENA Aguilera  March 16, 2023 10:53 EDT      Part of this note may be an electronic transcription/translation of spoken language to printed text using the Dragon Dictation System.

## 2023-04-27 ENCOUNTER — NURSE TRIAGE (OUTPATIENT)
Dept: CALL CENTER | Facility: HOSPITAL | Age: 33
End: 2023-04-27
Payer: COMMERCIAL

## 2023-04-27 NOTE — TELEPHONE ENCOUNTER
"Callsonny is concerned about her pinky toe that is sticking out to the side and is purple after hitting her toe last night. She said that when it happened it hurt to bad that she couldn't walk on it last night. It is painful to bear weight on it now. She has been wrapping it over night and the toe is remaining to stick out to the side.   Triage completed and advised patient to go to  to have it x-rayed and evaluated.   Patient said she will follow advice.     Reason for Disposition  • Looks like a broken bone (e.g., crooked or deformed)    Additional Information  • Negative: [1] Major bleeding (e.g., spurting blood) AND [2] can't be stopped  • Negative: Foot or ankle injury  • Negative: Wound looks infected  • Negative: Amputated toe  • Negative: Skin is split open or gaping (or length > 1/2 inch or 12 mm)  • Negative: [1] Bleeding AND [2] won't stop after 10 minutes of direct pressure (using correct technique)  • Negative: [1] Dirt in the wound AND [2] not removed with 15 minutes of scrubbing  • Negative: Sounds like a serious injury to the triager  • Negative: [1] SEVERE pain AND [2] not improved 2 hours after pain medicine/ice packs  • Negative: [1] MODERATE-SEVERE pain AND [2] blood present under the toenail    Answer Assessment - Initial Assessment Questions  1. MECHANISM: \"How did the injury happen?\"       Bumped it on furniture  2. ONSET: \"When did the injury happen?\" (Minutes or hours ago)       Last night  3. LOCATION: \"What part of the toe is injured?\" \"Is the nail damaged?\"       Pinky toe  4. APPEARANCE of TOE INJURY: \"What does the injury look like?\"      Dark purple and sticks out to the side  5. SEVERITY: \"Can you use the foot normally?\" \"Can you walk?\"       Painful to walk, not able to bear weight last night   6. SIZE: For cuts, bruises, or swelling, ask: \"How large is it?\" (e.g., inches or centimeters;  entire toe)       Entire toe is bruised  7. PAIN: \"Is there pain?\" If Yes, ask: \"How bad is the " "pain?\"   (e.g., Scale 1-10; or mild, moderate, severe)      moderate  8. TETANUS: For any breaks in the skin, ask: \"When was the last tetanus booster?\"     NA  9. DIABETES: \"Do you have a history of diabetes or poor circulation in the feet?\"  no  10. OTHER SYMPTOMS: \"Do you have any other symptoms?\"         no  11. PREGNANCY: \"Is there any chance you are pregnant?\" \"When was your last menstrual period?\"        Not sure    Protocols used: TOE INJURY-ADULT-AH      "

## 2023-09-14 ENCOUNTER — LAB (OUTPATIENT)
Dept: LAB | Facility: HOSPITAL | Age: 33
End: 2023-09-14
Payer: COMMERCIAL

## 2023-09-14 ENCOUNTER — OFFICE VISIT (OUTPATIENT)
Dept: INTERNAL MEDICINE | Facility: CLINIC | Age: 33
End: 2023-09-14
Payer: COMMERCIAL

## 2023-09-14 VITALS
BODY MASS INDEX: 19.29 KG/M2 | DIASTOLIC BLOOD PRESSURE: 64 MMHG | SYSTOLIC BLOOD PRESSURE: 112 MMHG | HEIGHT: 64 IN | WEIGHT: 113 LBS | OXYGEN SATURATION: 99 % | HEART RATE: 76 BPM

## 2023-09-14 DIAGNOSIS — Z13.6 HYPERTENSION SCREEN: ICD-10-CM

## 2023-09-14 DIAGNOSIS — F32.A ANXIETY AND DEPRESSION: ICD-10-CM

## 2023-09-14 DIAGNOSIS — Z00.00 ENCOUNTER FOR SCREENING AND PREVENTATIVE CARE: ICD-10-CM

## 2023-09-14 DIAGNOSIS — F17.200 TOBACCO DEPENDENCE: ICD-10-CM

## 2023-09-14 DIAGNOSIS — Z00.00 ENCOUNTER FOR SCREENING AND PREVENTATIVE CARE: Primary | ICD-10-CM

## 2023-09-14 DIAGNOSIS — N63.0 SWELLING OF BREAST: ICD-10-CM

## 2023-09-14 DIAGNOSIS — N63.25 MASS OVERLAPPING MULTIPLE QUADRANTS OF LEFT BREAST: ICD-10-CM

## 2023-09-14 DIAGNOSIS — N64.4 MASTODYNIA: ICD-10-CM

## 2023-09-14 DIAGNOSIS — F41.9 ANXIETY AND DEPRESSION: ICD-10-CM

## 2023-09-14 LAB
ALBUMIN SERPL-MCNC: 4.7 G/DL (ref 3.5–5.2)
ALBUMIN/GLOB SERPL: 1.6 G/DL
ALP SERPL-CCNC: 93 U/L (ref 39–117)
ALT SERPL W P-5'-P-CCNC: 10 U/L (ref 1–33)
ANION GAP SERPL CALCULATED.3IONS-SCNC: 9 MMOL/L (ref 5–15)
AST SERPL-CCNC: 15 U/L (ref 1–32)
BILIRUB SERPL-MCNC: 0.3 MG/DL (ref 0–1.2)
BUN SERPL-MCNC: 6 MG/DL (ref 6–20)
BUN/CREAT SERPL: 10 (ref 7–25)
CALCIUM SPEC-SCNC: 9.2 MG/DL (ref 8.6–10.5)
CHLORIDE SERPL-SCNC: 107 MMOL/L (ref 98–107)
CHOLEST SERPL-MCNC: 145 MG/DL (ref 0–200)
CO2 SERPL-SCNC: 26 MMOL/L (ref 22–29)
CREAT SERPL-MCNC: 0.6 MG/DL (ref 0.57–1)
DEPRECATED RDW RBC AUTO: 46.1 FL (ref 37–54)
EGFRCR SERPLBLD CKD-EPI 2021: 124 ML/MIN/1.73
ERYTHROCYTE [DISTWIDTH] IN BLOOD BY AUTOMATED COUNT: 13.5 % (ref 12.3–15.4)
GLOBULIN UR ELPH-MCNC: 2.9 GM/DL
GLUCOSE SERPL-MCNC: 82 MG/DL (ref 65–99)
HBA1C MFR BLD: 5.5 % (ref 4.8–5.6)
HCT VFR BLD AUTO: 40.2 % (ref 34–46.6)
HDLC SERPL-MCNC: 50 MG/DL (ref 40–60)
HGB BLD-MCNC: 12.5 G/DL (ref 12–15.9)
LDLC SERPL CALC-MCNC: 80 MG/DL (ref 0–100)
LDLC/HDLC SERPL: 1.58 {RATIO}
MCH RBC QN AUTO: 28.9 PG (ref 26.6–33)
MCHC RBC AUTO-ENTMCNC: 31.1 G/DL (ref 31.5–35.7)
MCV RBC AUTO: 93.1 FL (ref 79–97)
PLATELET # BLD AUTO: 332 10*3/MM3 (ref 140–450)
PMV BLD AUTO: 10.1 FL (ref 6–12)
POTASSIUM SERPL-SCNC: 4.1 MMOL/L (ref 3.5–5.2)
PROT SERPL-MCNC: 7.6 G/DL (ref 6–8.5)
RBC # BLD AUTO: 4.32 10*6/MM3 (ref 3.77–5.28)
SODIUM SERPL-SCNC: 142 MMOL/L (ref 136–145)
TRIGL SERPL-MCNC: 80 MG/DL (ref 0–150)
VLDLC SERPL-MCNC: 15 MG/DL (ref 5–40)
WBC NRBC COR # BLD: 7.04 10*3/MM3 (ref 3.4–10.8)

## 2023-09-14 PROCEDURE — 80053 COMPREHEN METABOLIC PANEL: CPT

## 2023-09-14 PROCEDURE — 85027 COMPLETE CBC AUTOMATED: CPT

## 2023-09-14 PROCEDURE — 83036 HEMOGLOBIN GLYCOSYLATED A1C: CPT

## 2023-09-14 PROCEDURE — 80061 LIPID PANEL: CPT

## 2023-09-14 PROCEDURE — 36415 COLL VENOUS BLD VENIPUNCTURE: CPT

## 2023-09-14 NOTE — PROGRESS NOTES
Chief Complaint   Patient presents with    Annual Exam         History:  Juanita Cedillo is a 32 y.o. female who presents today for evaluation of the above problems.      Annual exam.    Pap per GYN. Defers STI testing today.    Feels mental health is overall good. Stress of 4 small children, but anxiety and depressive symptoms stable on Zoloft. Denies suicidal ideation or need for referral at this time.    States is good with dental and eye exams/issues at this time.    Has started smoking cigarettes again because vaping was bothering her lungs. Maybe 1/2 PPD. Not ready to quit today.    As a separate issue today, for about 2-3 months her left breast has been painful and swelling. She has not felt any lumps. Sometimes it gets better. Not breastfeeding, no redness or heat. No discharge. No lymph nodes swollen in neck/underarms.      Social History     Socioeconomic History    Marital status:    Tobacco Use    Smoking status: Every Day     Packs/day: 0.50     Years: 14.00     Pack years: 7.00     Types: Cigarettes    Smokeless tobacco: Never   Vaping Use    Vaping Use: Former   Substance and Sexual Activity    Alcohol use: Yes     Alcohol/week: 1.0 standard drink     Types: 1 Drinks containing 0.5 oz of alcohol per week     Comment: once a week    Drug use: No    Sexual activity: Yes     Partners: Male     Birth control/protection: None       PHQ-9 Depression Screening  Little interest or pleasure in doing things? 1-->several days   Feeling down, depressed, or hopeless? 1-->several days   Trouble falling or staying asleep, or sleeping too much?     Feeling tired or having little energy?     Poor appetite or overeating?     Feeling bad about yourself - or that you are a failure or have let yourself or your family down?     Trouble concentrating on things, such as reading the newspaper or watching television?     Moving or speaking so slowly that other people could have noticed? Or the opposite - being so fidgety  or restless that you have been moving around a lot more than usual?     Thoughts that you would be better off dead, or of hurting yourself in some way?     PHQ-9 Total Score 2   If you checked off any problems, how difficult have these problems made it for you to do your work, take care of things at home, or get along with other people?         PHQ-9 Total Score: 2    ROS:  Review of Systems      Current Outpatient Medications:     sertraline (Zoloft) 50 MG tablet, Take 1 tablet by mouth Daily., Disp: 30 tablet, Rfl: 5    Lab Results   Component Value Date    GLUCOSE 86 12/17/2021    BUN 7 12/17/2021    CREATININE 0.69 12/17/2021    EGFR >60 01/19/2015    BCR 10.1 12/17/2021    K 4.0 12/17/2021    CO2 26.0 12/17/2021    CALCIUM 9.5 12/17/2021    ALBUMIN 4.50 12/17/2021    BILITOT 0.3 12/17/2021    AST 16 12/17/2021    ALT 11 12/17/2021       WBC   Date Value Ref Range Status   09/12/2022 10.26 3.40 - 10.80 10*3/mm3 Final   04/24/2018 5.7 4.8 - 10.8 K/uL Final     RBC   Date Value Ref Range Status   09/12/2022 4.38 3.77 - 5.28 10*6/mm3 Final   04/24/2018 4.54 4.20 - 5.40 M/uL Final     Hemoglobin   Date Value Ref Range Status   09/12/2022 12.4 12.0 - 15.9 g/dL Final   04/24/2018 13.5 12.0 - 16.0 g/dL Final     Hematocrit   Date Value Ref Range Status   09/12/2022 40.6 34.0 - 46.6 % Final   04/24/2018 41.7 37.0 - 47.0 % Final     MCV   Date Value Ref Range Status   09/12/2022 92.7 79.0 - 97.0 fL Final   04/24/2018 91.9 81.0 - 99.0 fL Final     MCH   Date Value Ref Range Status   09/12/2022 28.3 26.6 - 33.0 pg Final   04/24/2018 29.7 27.0 - 31.0 pg Final     MCHC   Date Value Ref Range Status   09/12/2022 30.5 (L) 31.5 - 35.7 g/dL Final   04/24/2018 32.4 (L) 33.0 - 37.0 g/dL Final     RDW   Date Value Ref Range Status   09/12/2022 14.0 12.3 - 15.4 % Final   04/24/2018 13.2 11.5 - 14.5 % Final     RDW-SD   Date Value Ref Range Status   09/12/2022 47.5 37.0 - 54.0 fl Final     MPV   Date Value Ref Range Status    09/12/2022 10.4 6.0 - 12.0 fL Final   04/24/2018 10.0 9.4 - 12.3 fL Final     Platelets   Date Value Ref Range Status   09/12/2022 266 140 - 450 10*3/mm3 Final   04/24/2018 325 130 - 400 K/uL Final     Neutrophil Rel %   Date Value Ref Range Status   04/24/2018 50.5 50.0 - 65.0 % Final     Neutrophil %   Date Value Ref Range Status   09/12/2022 67.4 42.7 - 76.0 % Final     Lymphocyte Rel %   Date Value Ref Range Status   04/24/2018 39.8 20.0 - 40.0 % Final     Lymphocyte %   Date Value Ref Range Status   09/12/2022 24.7 19.6 - 45.3 % Final     Monocyte Rel %   Date Value Ref Range Status   04/24/2018 4.9 0.0 - 10.0 % Final     Monocyte %   Date Value Ref Range Status   09/12/2022 3.8 (L) 5.0 - 12.0 % Final     Eosinophil Rel %   Date Value Ref Range Status   04/24/2018 3.8 0.0 - 5.0 % Final     Eosinophil %   Date Value Ref Range Status   09/12/2022 3.3 0.3 - 6.2 % Final     Basophil Rel %   Date Value Ref Range Status   04/24/2018 0.7 0.0 - 1.0 % Final     Basophil %   Date Value Ref Range Status   09/12/2022 0.4 0.0 - 1.5 % Final     Immature Grans %   Date Value Ref Range Status   09/12/2022 0.4 0.0 - 0.5 % Final     Neutrophils Absolute   Date Value Ref Range Status   04/24/2018 2.9 1.5 - 7.5 K/uL Final     Neutrophils, Absolute   Date Value Ref Range Status   09/12/2022 6.92 1.70 - 7.00 10*3/mm3 Final     Lymphocytes Absolute   Date Value Ref Range Status   04/24/2018 2.3 1.1 - 4.5 K/uL Final     Lymphocytes, Absolute   Date Value Ref Range Status   09/12/2022 2.53 0.70 - 3.10 10*3/mm3 Final     Monocytes Absolute   Date Value Ref Range Status   04/24/2018 0.30 0.00 - 0.90 K/uL Final     Monocytes, Absolute   Date Value Ref Range Status   09/12/2022 0.39 0.10 - 0.90 10*3/mm3 Final     Eosinophils Absolute   Date Value Ref Range Status   04/24/2018 0.20 0.00 - 0.60 K/uL Final     Eosinophils, Absolute   Date Value Ref Range Status   09/12/2022 0.34 0.00 - 0.40 10*3/mm3 Final     Basophils Absolute   Date Value  "Ref Range Status   04/24/2018 0.00 0.00 - 0.20 K/uL Final     Basophils, Absolute   Date Value Ref Range Status   09/12/2022 0.04 0.00 - 0.20 10*3/mm3 Final     Immature Grans, Absolute   Date Value Ref Range Status   09/12/2022 0.04 0.00 - 0.05 10*3/mm3 Final     nRBC   Date Value Ref Range Status   09/12/2022 0.0 0.0 - 0.2 /100 WBC Final         OBJECTIVE:  Visit Vitals  /64 (BP Location: Left arm, Patient Position: Sitting, Cuff Size: Adult)   Pulse 76   Ht 162.6 cm (64\")   Wt 51.3 kg (113 lb)   SpO2 99%   BMI 19.40 kg/m²      Physical Exam  Vitals and nursing note reviewed.   Constitutional:       General: She is not in acute distress.     Appearance: Normal appearance. She is not ill-appearing, toxic-appearing or diaphoretic.   HENT:      Head: Normocephalic and atraumatic.   Eyes:      General: No scleral icterus.        Right eye: No discharge.         Left eye: No discharge.      Conjunctiva/sclera: Conjunctivae normal.      Pupils: Pupils are equal, round, and reactive to light.   Cardiovascular:      Rate and Rhythm: Normal rate and regular rhythm.   Pulmonary:      Effort: Pulmonary effort is normal. No respiratory distress.      Breath sounds: Normal breath sounds. No wheezing.   Chest:          Comments: I examined both breasts while she was in sitting position. I do not appreciate axillary LAD or skin change or discharge on either side. There is a 5-7 mm palpable firm mass-like structure in the left breast at 3 o'clock just lateral to the areola. There is mild fullness of the upper outer quadrant of the left breast, with tenderness, compared to right breast.  Abdominal:      General: There is no distension.      Palpations: Abdomen is soft. There is no mass.      Tenderness: There is no abdominal tenderness.   Musculoskeletal:      Cervical back: Neck supple.      Right lower leg: No edema.      Left lower leg: No edema.   Lymphadenopathy:      Cervical: No cervical adenopathy (no cervical, " occipital, supraclavicular, or axillary LAD appreciated).   Skin:     General: Skin is warm and dry.   Neurological:      Mental Status: She is alert and oriented to person, place, and time.   Psychiatric:         Mood and Affect: Mood normal.         Behavior: Behavior normal.         Thought Content: Thought content normal.         Judgment: Judgment normal.       Assessment/Plan    Diagnoses and all orders for this visit:    1. Encounter for screening and preventative care (Primary)  -     Comprehensive metabolic panel; Future  -     Hemoglobin A1c; Future  -     Lipid panel; Future  -     CBC No Differential; Future    2. Hypertension screen    3. Anxiety and depression    4. Tobacco dependence    5. Mass overlapping multiple quadrants of left breast  -     Mammo diagnostic digital tomosynthesis bilateral w CAD; Future  -     US breast left complete; Future    6. Mastodynia  -     Mammo diagnostic digital tomosynthesis bilateral w CAD; Future  -     US breast left complete; Future    7. Swelling of breast  -     Mammo diagnostic digital tomosynthesis bilateral w CAD; Future  -     US breast left complete; Future    Labs for screening ordered. Hypertension screen negative.     Depression/anxiety stable on current medication.    Juanita Cedillo  reports that she has been smoking cigarettes. She has a 7.00 pack-year smoking history. She has never used smokeless tobacco.. I have educated her on the risk of diseases from using tobacco products such as cancer, COPD, and heart disease.     I advised her to quit and she is not willing to quit.    I spent 3  minutes counseling the patient.     As a separate issue today, we discussed the breast issues she is having. Evaluate palpable abnormality with testing as above. Discussed surgery referral depending on results, and patient happy to see surgeon if indicated.      Counseling/Anticipatory Guidance Discussed: family planning/contraception, healthy weight, injury  prevention, misuse of tobacco, alcohol and drugs, sexual behavior and STDs, dental health, mental health, and breast cancer and self breast exams    BMI is within normal parameters. No other follow-up for BMI required.      Return in about 6 months (around 3/14/2024) for re-check.    Patient was given instructions and counseling regarding his/her condition or for health maintenance advice. Please see specific information pulled into the AVS if appropriate.     GUERRERO Riggins  09:20 CDT  9/14/2023

## 2023-09-20 LAB
NCCN CRITERIA FLAG: NORMAL
TYRER CUZICK SCORE: 10.6

## 2023-09-21 ENCOUNTER — HOSPITAL ENCOUNTER (OUTPATIENT)
Dept: MAMMOGRAPHY | Facility: HOSPITAL | Age: 33
Discharge: HOME | End: 2023-09-21
Payer: COMMERCIAL

## 2023-09-21 ENCOUNTER — HOSPITAL ENCOUNTER (OUTPATIENT)
Dept: ULTRASOUND IMAGING | Facility: HOSPITAL | Age: 33
Discharge: HOME | End: 2023-09-21
Payer: COMMERCIAL

## 2023-09-21 DIAGNOSIS — N63.0 SWELLING OF BREAST: ICD-10-CM

## 2023-09-21 DIAGNOSIS — N64.4 MASTODYNIA: ICD-10-CM

## 2023-09-21 DIAGNOSIS — N63.25 MASS OVERLAPPING MULTIPLE QUADRANTS OF LEFT BREAST: ICD-10-CM

## 2023-09-21 PROCEDURE — 77066 DX MAMMO INCL CAD BI: CPT

## 2023-09-21 PROCEDURE — G0279 TOMOSYNTHESIS, MAMMO: HCPCS

## 2023-09-21 PROCEDURE — 76642 ULTRASOUND BREAST LIMITED: CPT

## 2023-09-26 ENCOUNTER — HOSPITAL ENCOUNTER (OUTPATIENT)
Dept: MAMMOGRAPHY | Facility: HOSPITAL | Age: 33
Discharge: HOME | End: 2023-09-26
Payer: COMMERCIAL

## 2023-09-26 ENCOUNTER — HOSPITAL ENCOUNTER (OUTPATIENT)
Dept: ULTRASOUND IMAGING | Facility: HOSPITAL | Age: 33
Discharge: HOME | End: 2023-09-26
Payer: COMMERCIAL

## 2023-09-26 DIAGNOSIS — R92.8 ABNORMAL MAMMOGRAM: ICD-10-CM

## 2023-09-26 PROCEDURE — 88305 TISSUE EXAM BY PATHOLOGIST: CPT | Performed by: NURSE PRACTITIONER

## 2023-09-26 PROCEDURE — A4648 IMPLANTABLE TISSUE MARKER: HCPCS

## 2023-09-26 RX ORDER — LIDOCAINE HYDROCHLORIDE AND EPINEPHRINE 10; 10 MG/ML; UG/ML
10 INJECTION, SOLUTION INFILTRATION; PERINEURAL ONCE
Status: ACTIVE | OUTPATIENT
Start: 2023-09-26

## 2023-09-26 RX ORDER — LIDOCAINE HYDROCHLORIDE 10 MG/ML
10 INJECTION, SOLUTION INFILTRATION; PERINEURAL ONCE
Status: ACTIVE | OUTPATIENT
Start: 2023-09-26

## 2023-09-27 DIAGNOSIS — D24.2 FIBROADENOMA OF BREAST, LEFT: Primary | ICD-10-CM

## 2023-09-27 LAB
CYTO UR: NORMAL
LAB AP CASE REPORT: NORMAL
Lab: NORMAL
PATH REPORT.FINAL DX SPEC: NORMAL
PATH REPORT.GROSS SPEC: NORMAL

## 2023-09-29 DIAGNOSIS — D24.2 FIBROADENOMA OF BREAST, LEFT: Primary | ICD-10-CM

## 2023-10-23 ENCOUNTER — OFFICE VISIT (OUTPATIENT)
Dept: SURGERY | Facility: CLINIC | Age: 33
End: 2023-10-23
Payer: COMMERCIAL

## 2023-10-23 VITALS
SYSTOLIC BLOOD PRESSURE: 103 MMHG | HEART RATE: 68 BPM | DIASTOLIC BLOOD PRESSURE: 72 MMHG | BODY MASS INDEX: 19.67 KG/M2 | WEIGHT: 111 LBS | HEIGHT: 63 IN

## 2023-10-23 DIAGNOSIS — F17.200 TOBACCO DEPENDENCE: Primary | ICD-10-CM

## 2023-10-23 DIAGNOSIS — F17.210 NICOTINE DEPENDENCE, CIGARETTES, UNCOMPLICATED: ICD-10-CM

## 2023-10-23 DIAGNOSIS — D24.2 BREAST FIBROADENOMA IN FEMALE, LEFT: Primary | ICD-10-CM

## 2023-10-23 RX ORDER — VARENICLINE TARTRATE 0.5 (11)-1
KIT ORAL
Qty: 1 EACH | Refills: 0 | Status: SHIPPED | OUTPATIENT
Start: 2023-10-23 | End: 2023-11-20

## 2023-10-23 RX ORDER — VARENICLINE TARTRATE 1 MG/1
1 TABLET, FILM COATED ORAL 2 TIMES DAILY
Qty: 56 TABLET | Refills: 1 | Status: SHIPPED | OUTPATIENT
Start: 2023-11-20 | End: 2024-01-15

## 2023-10-23 NOTE — PROGRESS NOTES
Office New Patient History and Physical:     Referring Provider: Dora Sims APRN    Chief Complaint   Patient presents with    Abnormal Breast Imaging     Mrs. Cedillo is here for breast evaluation.        Subjective .     History of present illness:  Juanita Cedillo is a 32 y.o. female who presetns with a left breast fibroadenoma. She first felt it a month ago and showed it to her OBGYN who worked it up. Biopsy showed a fibroadenoma. It is getting bigger. She endorses intermittent pain.     Breast History information:   Prior abnormal mammograms: this was her first mammogram   Prior breast biopsies: first biopsy now   Palpable breast masses: yes, left breast   Nipple discharge: none   Age at first menses: 13  Age at menopause: n/a  Number of biological children: 4  Age at first birth: 22  Years on birth control: none   Years on HRT: none   Family history of breast cancer: none   Smoking History: current every day smoker at 0.5 PPD   Alcohol use: none   BMI: 19.66    She is not on blood thinners.      History  Past Medical History:   Diagnosis Date    Anxiety     Depression     Disease of thyroid gland     Eating disorder     Headache    ,   Past Surgical History:   Procedure Laterality Date    SALPINGECTOMY Bilateral 12/20/2021    Procedure: LAPAROSCOPIC BILATERAL SALPINGECTOMY;  Surgeon: Sebastian Navas MD;  Location: [facility] OR;  Service: Obstetrics/Gynecology;  Laterality: Bilateral;    SEPTOPLASTY  2021    WISDOM TOOTH EXTRACTION     ,   Family History   Problem Relation Age of Onset    Drug abuse Father     Alcohol abuse Father     Depression Mother     Alcohol abuse Mother     Anxiety disorder Mother     Arthritis Mother     Miscarriages / Stillbirths Mother     Diabetes Paternal Grandfather     Dementia Paternal Grandfather     Alcohol abuse Paternal Grandmother     Cancer Paternal Grandmother         kidney & bone    Early death Paternal Grandmother     Kidney disease Paternal Grandmother     Cancer  "Maternal Grandmother     Alcohol abuse Maternal Grandmother     Drug abuse Maternal Grandmother     Hearing loss Maternal Grandmother     Vision loss Maternal Grandmother     Diabetes Maternal Grandfather     Stroke Maternal Grandfather     Arthritis Maternal Grandfather     Breast cancer Neg Hx     Ovarian cancer Neg Hx     Uterine cancer Neg Hx     Colon cancer Neg Hx     Melanoma Neg Hx    ,   Social History     Tobacco Use    Smoking status: Every Day     Packs/day: 0.50     Years: 14.00     Additional pack years: 0.00     Total pack years: 7.00     Types: Cigarettes    Smokeless tobacco: Never   Vaping Use    Vaping Use: Former   Substance Use Topics    Alcohol use: Yes     Alcohol/week: 1.0 standard drink of alcohol     Types: 1 Drinks containing 0.5 oz of alcohol per week     Comment: once a week    Drug use: No   , (Not in a hospital admission)   and Allergies:  Patient has no allergy information on record.    Current Outpatient Medications:     sertraline (Zoloft) 50 MG tablet, Take 1 tablet by mouth Daily., Disp: 30 tablet, Rfl: 5    [START ON 11/20/2023] varenicline (Chantix Continuing Month Harley) 1 MG tablet, Take 1 tablet by mouth 2 (Two) Times a Day for 56 days., Disp: 56 tablet, Rfl: 1    Varenicline Tartrate, Starter, 0.5 MG X 11 & 1 MG X 42 tablet therapy pack, Take 0.5 mg by mouth Daily for 3 days, THEN 0.5 mg 2 (Two) Times a Day for 4 days, THEN 1 mg 2 (Two) Times a Day for 21 days., Disp: 1 each, Rfl: 0    Current Facility-Administered Medications:     lidocaine (XYLOCAINE) 1 % injection 10 mL, 10 mL, Subcutaneous, Once, Ricarda Francois MD    lidocaine 1% - EPINEPHrine 1:320739 (XYLOCAINE W/EPI) 1 %-1:261987 injection 10 mL, 10 mL, Injection, Once, Ricarda Francois MD    Objective     Vital Signs   /72   Pulse 68   Ht 160 cm (63\")   Wt 50.3 kg (111 lb)   BMI 19.66 kg/m²      Physical Exam:  General appearance - alert, well appearing, and in no distress  Mental status - " alert, oriented to person, place, and time  Eyes - pupils equal and reactive, extraocular eye movements intact  Neck - supple, no significant adenopathy  Chest - no tachypnea, retractions or cyanosis  Heart - normal rate and regular rhythm  Abdomen - soft, nontender, nondistended, no masses or organomegaly  Neurological - alert, oriented, normal speech, no focal findings or movement disorder noted  Musculoskeletal - no joint tenderness, deformity or swelling  Breast Exam: Bilateral breasts without obvious deformities. Bilateral nipples everted. Patient examined in the supine position with the ipsilateral arm above the head. No palpable masses bilaterally. No nipple discharge bilaterally. No palpable axillary nor supraclavicular adenopathy bilaterally.     Results Review:    The following data was reviewed by: Alie Bravo MD on 10/23/2023:  Mammo Diagnostic Digital Tomosynthesis Bilateral With CAD (09/21/2023 09:16)   US Breast Left Limited (09/21/2023 09:39)   US Guided Breast Biopsy With & Without Device initial (09/26/2023 08:51)   Mammo Post Device Placement Left (09/26/2023 09:03)   Tissue Pathology Exam (09/26/2023 08:44)   Left breast 2:00 3 cm from nipple, core biopsies:  Fibroadenoma.    Assessment & Plan       Diagnoses and all orders for this visit:    1. Breast fibroadenoma in female, left (Primary)  -     US Breast Left Limited; Future    2. Nicotine dependence, cigarettes, uncomplicated         Juanita Cedillo is a 32 y.o. female with a biopsy proven fibroadenoma of the left breast. This is concordant with imaging. 6 month follow up mammogram and US recommended but she is very anxious about this lesion. I therefore recommended a follow up US in 3 months to ensure no significant growth. She is much more comfortable with this.     This is a new diagnosis. I have reviewed the mammogram, US, US biopsy, post biopsy mammogram and pathology above. I have ordered an US.     BMI is within normal  parameters. No other follow-up for BMI required.      Alie Bravo MD  10/26/23  19:28 CDT

## 2023-10-27 NOTE — PATIENT INSTRUCTIONS
Smoking Tobacco Information, Adult  Smoking tobacco can be harmful to your health. Tobacco contains a poisonous (toxic), colorless chemical called nicotine. Nicotine is addictive. It changes the brain and can make it hard to stop smoking. Tobacco also has other toxic chemicals that can hurt your body and raise your risk of many cancers.  How can smoking tobacco affect me?  Smoking tobacco puts you at risk for:  Cancer. Smoking is most commonly associated with lung cancer, but can also lead to cancer in other parts of the body.  Chronic obstructive pulmonary disease (COPD). This is a long-term lung condition that makes it hard to breathe. It also gets worse over time.  High blood pressure (hypertension), heart disease, stroke, or heart attack.  Lung infections, such as pneumonia.  Cataracts. This is when the lenses in the eyes become clouded.  Digestive problems. This may include peptic ulcers, heartburn, and gastroesophageal reflux disease (GERD).  Oral health problems, such as gum disease and tooth loss.  Loss of taste and smell.  Smoking can affect your appearance by causing:  Wrinkles.  Yellow or stained teeth, fingers, and fingernails.  Smoking tobacco can also affect your social life, because:  It may be challenging to find places to smoke when away from home. Many workplaces, restaurants, hotels, and public places are tobacco-free.  Smoking is expensive. This is due to the cost of tobacco and the long-term costs of treating health problems from smoking.  Secondhand smoke may affect those around you. Secondhand smoke can cause lung cancer, breathing problems, and heart disease. Children of smokers have a higher risk for:  Sudden infant death syndrome (SIDS).  Ear infections.  Lung infections.  If you currently smoke tobacco, quitting now can help you:  Lead a longer and healthier life.  Look, smell, breathe, and feel better over time.  Save money.  Protect others from the harms of secondhand smoke.  What  actions can I take to prevent health problems?  Quit smoking    Do not start smoking. Quit if you already do.  Make a plan to quit smoking and commit to it. Look for programs to help you and ask your health care provider for recommendations and ideas.  Set a date and write down all the reasons you want to quit.  Let your friends and family know you are quitting so they can help and support you. Consider finding friends who also want to quit. It can be easier to quit with someone else, so that you can support each other.  Talk with your health care provider about using nicotine replacement medicines to help you quit, such as gum, lozenges, patches, sprays, or pills.  Do not replace cigarette smoking with electronic cigarettes, which are commonly called e-cigarettes. The safety of e-cigarettes is not known, and some may contain harmful chemicals.  If you try to quit but return to smoking, stay positive. It is common to slip up when you first quit, so take it one day at a time.  Be prepared for cravings. When you feel the urge to smoke, chew gum or suck on hard candy.    Lifestyle  Stay busy and take care of your body.  Drink enough fluid to keep your urine pale yellow.  Get plenty of exercise and eat a healthy diet. This can help prevent weight gain after quitting.  Monitor your eating habits. Quitting smoking can cause you to have a larger appetite than when you smoke.  Find ways to relax. Go out with friends or family to a movie or a restaurant where people do not smoke.  Ask your health care provider about having regular tests (screenings) to check for cancer. This may include blood tests, imaging tests, and other tests.  Find ways to manage your stress, such as meditation, yoga, or exercise.  Where to find support  To get support to quit smoking, consider:  Asking your health care provider for more information and resources.  Taking classes to learn more about quitting smoking.  Looking for local organizations  that offer resources about quitting smoking.  Joining a support group for people who want to quit smoking in your local community.  Calling the smokefree.gov counselor helpline: 1-800-Quit-Now (1-748.370.2756)  Where to find more information  You may find more information about quitting smoking from:  HelpGuide.org: www.helpguide.org  Smokefree.gov: smokefree.gov  American Lung Association: www.lung.org  Contact a health care provider if you:  Have problems breathing.  Notice that your lips, nose, or fingers turn blue.  Have chest pain.  Are coughing up blood.  Feel faint or you pass out.  Have other health changes that cause you to worry.  Summary  Smoking tobacco can negatively affect your health, the health of those around you, your finances, and your social life.  Do not start smoking. Quit if you already do. If you need help quitting, ask your health care provider.  Think about joining a support group for people who want to quit smoking in your local community. There are many effective programs that will help you to quit this behavior.  This information is not intended to replace advice given to you by your health care provider. Make sure you discuss any questions you have with your health care provider.  Document Revised: 09/11/2020 Document Reviewed: 01/02/2018  Elsevier Patient Education © 2021 [employer] Inc.

## 2023-11-30 DIAGNOSIS — F41.9 ANXIETY AND DEPRESSION: Primary | ICD-10-CM

## 2023-11-30 DIAGNOSIS — F32.A ANXIETY AND DEPRESSION: Primary | ICD-10-CM

## 2024-01-24 ENCOUNTER — HOSPITAL ENCOUNTER (OUTPATIENT)
Dept: ULTRASOUND IMAGING | Facility: HOSPITAL | Age: 34
Discharge: HOME | End: 2024-01-24
Admitting: STUDENT IN AN ORGANIZED HEALTH CARE EDUCATION/TRAINING PROGRAM
Payer: COMMERCIAL

## 2024-01-24 DIAGNOSIS — D24.2 BREAST FIBROADENOMA IN FEMALE, LEFT: ICD-10-CM

## 2024-01-24 PROCEDURE — 76642 ULTRASOUND BREAST LIMITED: CPT

## 2024-01-24 NOTE — PROGRESS NOTES
Kerri, please call this patient and let her know that her US showed a stable fibroadenoma. No concern. She does need a follow up with me or Kerri Amado.

## 2024-01-25 ENCOUNTER — TELEPHONE (OUTPATIENT)
Dept: SURGERY | Facility: CLINIC | Age: 34
End: 2024-01-25
Payer: COMMERCIAL

## 2024-01-25 NOTE — TELEPHONE ENCOUNTER
----- Message from Alie Bravo MD sent at 1/24/2024 10:04 AM CST -----  Kerri, please call this patient and let her know that her US showed a stable fibroadenoma. No concern.     Patient called and discussed results. She voiced understanding of this. She has a follow up appointment with me on 1/29/24.

## 2024-01-26 ENCOUNTER — TELEPHONE (OUTPATIENT)
Dept: OBSTETRICS AND GYNECOLOGY | Facility: CLINIC | Age: 34
End: 2024-01-26

## 2024-01-26 NOTE — TELEPHONE ENCOUNTER
Caller: Juanita Cedillo    Relationship: Self    Best call back number: [phone]    What is the best time to reach you: ANYTIME    Do you know the name of the person who called: UNKNOWN    What was the call regarding: UNKNOWN    PATIENT RECEIVED CALL FROM US BUT NO NOTE IN THE CHART AS TO THE REASON FOR THE CALL. PATIENT STATED THAT SHE LEFT A MESSAGE FOR [employer] AND THEY COULD BE RESPONDING TO THAT.

## 2024-01-26 NOTE — TELEPHONE ENCOUNTER
Called patient back. She had sent a message through  [employer] for an appointment. I advised that those messages went to the hub and that's why we couldn't see the message.  She got an appt scheduled for 1/29/24 @ 469.  No additional questions.

## 2024-01-29 ENCOUNTER — OFFICE VISIT (OUTPATIENT)
Dept: OBSTETRICS AND GYNECOLOGY | Age: 34
End: 2024-01-29
Payer: COMMERCIAL

## 2024-01-29 ENCOUNTER — OFFICE VISIT (OUTPATIENT)
Dept: SURGERY | Facility: CLINIC | Age: 34
End: 2024-01-29
Payer: COMMERCIAL

## 2024-01-29 VITALS
WEIGHT: 110 LBS | DIASTOLIC BLOOD PRESSURE: 68 MMHG | SYSTOLIC BLOOD PRESSURE: 104 MMHG | BODY MASS INDEX: 19.49 KG/M2 | HEIGHT: 63 IN

## 2024-01-29 VITALS
HEART RATE: 70 BPM | WEIGHT: 112 LBS | BODY MASS INDEX: 19.84 KG/M2 | SYSTOLIC BLOOD PRESSURE: 104 MMHG | OXYGEN SATURATION: 98 % | DIASTOLIC BLOOD PRESSURE: 68 MMHG | HEIGHT: 63 IN

## 2024-01-29 DIAGNOSIS — R59.9 SWELLING OF LYMPH NODES: ICD-10-CM

## 2024-01-29 DIAGNOSIS — L29.2 ITCHING OF VULVA: ICD-10-CM

## 2024-01-29 DIAGNOSIS — R30.0 DYSURIA: ICD-10-CM

## 2024-01-29 DIAGNOSIS — F39 MOOD DISORDER: ICD-10-CM

## 2024-01-29 DIAGNOSIS — N94.9 GENITAL LESION, FEMALE: ICD-10-CM

## 2024-01-29 DIAGNOSIS — D24.2 BREAST FIBROADENOMA IN FEMALE, LEFT: Primary | ICD-10-CM

## 2024-01-29 DIAGNOSIS — N89.8 VAGINAL ITCHING: Primary | ICD-10-CM

## 2024-01-29 LAB
BILIRUB BLD-MCNC: ABNORMAL MG/DL
CLARITY, POC: ABNORMAL
COLOR UR: ABNORMAL
ERYTHROCYTE [DISTWIDTH] IN BLOOD BY AUTOMATED COUNT: 12.9 % (ref 12.3–15.4)
GLUCOSE UR STRIP-MCNC: NEGATIVE MG/DL
HCT VFR BLD AUTO: 37.5 % (ref 34–46.6)
HGB BLD-MCNC: 12.5 G/DL (ref 12–15.9)
KETONES UR QL: ABNORMAL
LEUKOCYTE EST, POC: NEGATIVE
MCH RBC QN AUTO: 30.5 PG (ref 26.6–33)
MCHC RBC AUTO-ENTMCNC: 33.3 G/DL (ref 31.5–35.7)
MCV RBC AUTO: 91.5 FL (ref 79–97)
NITRITE UR-MCNC: NEGATIVE MG/ML
PH UR: 5 [PH] (ref 5–8)
PLATELET # BLD AUTO: 343 10*3/MM3 (ref 140–450)
PROT UR STRIP-MCNC: ABNORMAL MG/DL
RBC # BLD AUTO: 4.1 10*6/MM3 (ref 3.77–5.28)
RBC # UR STRIP: ABNORMAL /UL
SP GR UR: 1.03 (ref 1–1.03)
UROBILINOGEN UR QL: NORMAL
WBC # BLD AUTO: 9.91 10*3/MM3 (ref 3.4–10.8)

## 2024-01-29 PROCEDURE — 99214 OFFICE O/P EST MOD 30 MIN: CPT | Performed by: NURSE PRACTITIONER

## 2024-01-29 RX ORDER — ESTRADIOL 0.1 MG/G
2 CREAM VAGINAL DAILY
Qty: 42.5 G | Refills: 12 | Status: SHIPPED | OUTPATIENT
Start: 2024-01-29

## 2024-01-29 RX ORDER — FLUOXETINE 10 MG/1
10 CAPSULE ORAL DAILY
Qty: 30 CAPSULE | Refills: 3 | Status: SHIPPED | OUTPATIENT
Start: 2024-01-29

## 2024-01-29 NOTE — PROGRESS NOTES
Office Established Patient Note:     Referring Provider: No ref. provider found    No chief complaint on file.      Subjective .     History of present illness:  Juanita Cedillo is a 33 y.o. female who presents to the clinic for short-term follow-up of breast fibroadenoma in the left breast.  She previously was seen by Dr. Bravo in October 2023 following a biopsy that showed fibroadenoma.  At that time, the patient felt that it was getting bigger with intermittent pain.  Following the biopsy 6-month mammogram and ultrasound was recommended.  However, the patient preferred to have follow-up sooner than that.  Dr. Bravo ordered a 3-month ultrasound to ensure no significant growth.  She had this imaging done on 1/24/2024.    BMI is 19.84.  She is a 0.5 PPD smoker.  She does not take any blood thinners.    History  Past Medical History:   Diagnosis Date    Anxiety     Depression     Disease of thyroid gland     Eating disorder     Headache    ,   Past Surgical History:   Procedure Laterality Date    BREAST BIOPSY      MYOMECTOMY ABDOMINAL APPROACH      SALPINGECTOMY Bilateral 12/20/2021    Procedure: LAPAROSCOPIC BILATERAL SALPINGECTOMY;  Surgeon: Sebastian Navas MD;  Location: [facility];  Service: Obstetrics/Gynecology;  Laterality: Bilateral;    SEPTOPLASTY  2021    WISDOM TOOTH EXTRACTION     ,   Family History   Problem Relation Age of Onset    Drug abuse Father     Alcohol abuse Father     Depression Mother     Alcohol abuse Mother     Anxiety disorder Mother     Arthritis Mother     Miscarriages / Stillbirths Mother     Melanoma Sister     Diabetes Paternal Grandfather     Dementia Paternal Grandfather     Alcohol abuse Paternal Grandmother     Cancer Paternal Grandmother         kidney & bone    Early death Paternal Grandmother     Kidney disease Paternal Grandmother     Cancer Maternal Grandmother     Alcohol abuse Maternal Grandmother     Drug abuse Maternal Grandmother     Hearing loss Maternal  "Grandmother     Vision loss Maternal Grandmother     Melanoma Maternal Grandmother     Diabetes Maternal Grandfather     Stroke Maternal Grandfather     Arthritis Maternal Grandfather     Liver disease Maternal Grandfather     Breast cancer Neg Hx     Ovarian cancer Neg Hx     Uterine cancer Neg Hx     Colon cancer Neg Hx    ,   Social History     Tobacco Use    Smoking status: Every Day     Packs/day: 0.50     Years: 14.00     Additional pack years: 0.00     Total pack years: 7.00     Types: Cigarettes    Smokeless tobacco: Never   Vaping Use    Vaping Use: Former   Substance Use Topics    Alcohol use: Yes     Alcohol/week: 1.0 standard drink of alcohol     Types: 1 Drinks containing 0.5 oz of alcohol per week     Comment: once a week    Drug use: No   , (Not in a hospital admission)   and Allergies:  Patient has no known allergies.    Current Outpatient Medications:     estradiol (ESTRACE VAGINAL) 0.1 MG/GM vaginal cream, Insert 2 applicators into the vagina Daily., Disp: 42.5 g, Rfl: 12    FLUoxetine (PROzac) 10 MG capsule, Take 1 capsule by mouth Daily., Disp: 30 capsule, Rfl: 3    Current Facility-Administered Medications:     lidocaine (XYLOCAINE) 1 % injection 10 mL, 10 mL, Subcutaneous, Once, Ricarda Francois MD    lidocaine 1% - EPINEPHrine 1:171544 (XYLOCAINE W/EPI) 1 %-1:864490 injection 10 mL, 10 mL, Injection, Once, Ricarda Francois MD    Objective     Vital Signs   /68   Pulse 70   Ht 160 cm (63\")   Wt 50.8 kg (112 lb)   SpO2 98%   BMI 19.84 kg/m²      Physical Exam:  General appearance - alert, well appearing, and in no distress, oriented to person, place, and time, and normal appearing weight  Mental status - alert, oriented to person, place, and time, normal mood, behavior, speech, dress, motor activity, and thought processes  Eyes - pupils equal and reactive, extraocular eye movements intact, sclera anicteric  Neck - supple, no significant adenopathy  Chest - no tachypnea, " retractions or cyanosis  Heart - normal rate and regular rhythm  Breasts - breasts appear normal, no suspicious masses, no skin or nipple changes or axillary nodes  Neurological - alert, oriented, normal speech, no focal findings or movement disorder noted  Skin - normal coloration and turgor, no rashes, no suspicious skin lesions noted    Results Review:  Result Review :            Progress Notes by Alie Bravo MD (10/23/2023 10:45)   Juanita Cedillo is a 32 y.o. female with a biopsy proven fibroadenoma of the left breast. This is concordant with imaging. 6 month follow up mammogram and US recommended but she is very anxious about this lesion. I therefore recommended a follow up US in 3 months to ensure no significant growth. She is much more comfortable with this.      This is a new diagnosis. I have reviewed the mammogram, US, US biopsy, post biopsy mammogram and pathology above. I have ordered an US.     US Breast Left Limited (01/24/2024 08:22)   1. Residual fibroadenoma with size described above. There is an adjacent  HydroMARK clip. This is considered benign. BI-RADS 2.  2. Follow-up ultrasound recommended if there is additional clinical  concern for growth or new abnormality. Mammography recommended at age 42 unless interim problems develop.    Assessment & Plan       Diagnoses and all orders for this visit:    1. Breast fibroadenoma in female, left (Primary)    At this time, Ms. Cedillo is still scheduled for 6-month mammogram and ultrasound following the benign biopsy.  Dr. Bravo ordered this at her visit in October 2023.  It is scheduled for 3/27/2024.  Ultrasound at 3 months showed stability in the size of this lesion.  Upon 6-month follow-up imaging, the patient will follow-up in office for another clinical breast exam as well as discussion of these results.  If there continues to be stability of this lesion, the patient will be due for screening mammograms once she reaches the age of 42,  unless she has new problems develop.  She will return to see me at the beginning of April after she has her 6-month imaging.  She will call for an appointment sooner if she has any new problems or concerns.  She voices understanding and is agreeable to this plan.    Follow up:     BMI is within normal parameters. No other follow-up for BMI required.    Return in about 2 months (around 3/29/2024) for Recheck.        Kerri Baron PA-C  01/31/24  09:38 CST

## 2024-01-29 NOTE — PROGRESS NOTES
Chief Complaint   Patient presents with    Vaginitis     Patient is here for evaluation of intense vaginal itching since around October 2023, but it has been ongoing for over a year.  She states the itching is comparable to chicken pox itching.  Pt hx of tubal, not on any form of other birth control. She reports lymph nodes are enlarged in both sides of the groin and painful. She used Estradiol cream in 10/2022 which she states helped but she discontinued.    Anxiety       History:  Juanita Cedillo is a 33 y.o. female who presents today for follow-up for evaluation of the above:    Vaginitis  This is a chronic problem. The current episode started more than 1 year ago. The problem occurs constantly. The problem has been gradually worsening. Associated symptoms include a change in bowel habit (diarrhea), fatigue, a rash and urinary symptoms. Pertinent negatives include no fever or sore throat. The symptoms are aggravated by intercourse. Treatments tried: vagisil. The treatment provided no relief.       Swollen lymph nodes in the groin that started over the weekend causing inability to walk. Occurred after shaving.   Has been using vagisil for itching. Has used over a week.     Additionally she reports an increase in her agitation and anxiety. Was on Zoloft but feels this was not controlling symptoms.   Has been out of the medication for several months.           ROS:  Review of Systems   Constitutional:  Positive for fatigue. Negative for fever.   HENT:  Negative for sore throat.    Gastrointestinal:  Positive for change in bowel habit (diarrhea) and diarrhea.   Genitourinary:  Positive for genital sores and vaginal pain.        Swollen lymph nodes in groin   Skin:  Positive for rash.   Psychiatric/Behavioral:  Positive for agitation. The patient is nervous/anxious.        Ms. Cedillo  reports that she has been smoking cigarettes. She has a 7.00 pack-year smoking history. She has never used smokeless tobacco. She  "reports current alcohol use of about 1.0 standard drink of alcohol per week. She reports that she does not use drugs.      Current Outpatient Medications:     estradiol (ESTRACE VAGINAL) 0.1 MG/GM vaginal cream, Insert 2 applicators into the vagina Daily., Disp: 42.5 g, Rfl: 12    FLUoxetine (PROzac) 10 MG capsule, Take 1 capsule by mouth Daily., Disp: 30 capsule, Rfl: 3    Current Facility-Administered Medications:     lidocaine (XYLOCAINE) 1 % injection 10 mL, 10 mL, Subcutaneous, Once, Ricarda Francois MD    lidocaine 1% - EPINEPHrine 1:666977 (XYLOCAINE W/EPI) 1 %-1:197822 injection 10 mL, 10 mL, Injection, Once, Ricarda Francois MD      OBJECTIVE:  /68   Ht 160 cm (63\")   Wt 49.9 kg (110 lb)   LMP 01/18/2024 (Exact Date)   BMI 19.49 kg/m²    Physical Exam  Exam conducted with a chaperone present.   Constitutional:       Appearance: She is not ill-appearing.   Pulmonary:      Effort: No respiratory distress.   Genitourinary:     Labia:         Right: Lesion present.         Left: Lesion present.       Vagina: Vaginal discharge (thin white) present.          Comments: Red open skin lesions that are crusted on bilateral labia.   Lymphadenopathy:      Lower Body: Right inguinal adenopathy present. Left inguinal adenopathy present.   Neurological:      Mental Status: She is oriented to person, place, and time.   Psychiatric:         Behavior: Behavior normal.         Assessment/Plan    Diagnoses and all orders for this visit:    1. Vaginal itching (Primary)  -     NuSwab VG+ - Swab, Cervix    2. Itching of vulva  -     estradiol (ESTRACE VAGINAL) 0.1 MG/GM vaginal cream; Insert 2 applicators into the vagina Daily.  Dispense: 42.5 g; Refill: 12  Restart and encouraged 3-4 months of consistent use.     3. Mood disorder  -     FLUoxetine (PROzac) 10 MG capsule; Take 1 capsule by mouth Daily.  Dispense: 30 capsule; Refill: 3  It will take 3-4 weeks to see some effect and 6-8 weeks to see full " effect.  If the dose is ineffective or suboptimal, the patient should notify the clinic for a consideration of a dose increase.  Starting this medication could worsen symptoms of SI/HI. This as an emergency and reason to seek care immediately.   Close f/u with PCP recommended. She states she has a future appt in March 4. Dysuria  -     POC Urinalysis Dipstick  -     Urine Culture - Urine, Urine, Random Void    5. Swelling of lymph nodes  -     CBC (No Diff)  -     Urine Culture - Urine, Urine, Random Void    6. Genital lesion, female  -     NuSwab VG+ - Swab, Cervix  Cannot rule out herpetic lesions. Culture obtained.          An After Visit Summary was printed and given to the patient at discharge.  Return for Annual physical. Sooner if problems arise.          Fernanda MASTERS. 1/29/2024   Electronically Signed

## 2024-01-31 LAB
A VAGINAE DNA VAG QL NAA+PROBE: NORMAL SCORE
BACTERIA UR CULT: NO GROWTH
BACTERIA UR CULT: NORMAL
BVAB2 DNA VAG QL NAA+PROBE: NORMAL SCORE
C ALBICANS DNA VAG QL NAA+PROBE: NEGATIVE
C GLABRATA DNA VAG QL NAA+PROBE: NEGATIVE
C TRACH DNA VAG QL NAA+PROBE: NEGATIVE
MEGA1 DNA VAG QL NAA+PROBE: NORMAL SCORE
N GONORRHOEA DNA VAG QL NAA+PROBE: NEGATIVE
T VAGINALIS DNA VAG QL NAA+PROBE: NEGATIVE

## 2024-02-02 LAB
HSV1 DNA SPEC QL NAA+PROBE: NEGATIVE
HSV2 DNA SPEC QL NAA+PROBE: NEGATIVE
WRITTEN AUTHORIZATION: NORMAL

## 2024-02-23 DIAGNOSIS — N89.8 VAGINAL ITCHING: Primary | ICD-10-CM

## 2024-02-23 RX ORDER — HYDROXYZINE HYDROCHLORIDE 25 MG/1
25 TABLET, FILM COATED ORAL 3 TIMES DAILY PRN
Qty: 90 TABLET | Refills: 3 | Status: SHIPPED | OUTPATIENT
Start: 2024-02-23

## 2024-03-14 ENCOUNTER — LAB (OUTPATIENT)
Dept: LAB | Facility: HOSPITAL | Age: 34
End: 2024-03-14
Payer: COMMERCIAL

## 2024-03-14 ENCOUNTER — OFFICE VISIT (OUTPATIENT)
Dept: INTERNAL MEDICINE | Facility: CLINIC | Age: 34
End: 2024-03-14
Payer: COMMERCIAL

## 2024-03-14 VITALS
SYSTOLIC BLOOD PRESSURE: 116 MMHG | HEIGHT: 63 IN | DIASTOLIC BLOOD PRESSURE: 62 MMHG | WEIGHT: 113 LBS | BODY MASS INDEX: 20.02 KG/M2 | HEART RATE: 90 BPM | OXYGEN SATURATION: 99 %

## 2024-03-14 DIAGNOSIS — F41.9 ANXIETY: ICD-10-CM

## 2024-03-14 DIAGNOSIS — F39 MOOD DISORDER: ICD-10-CM

## 2024-03-14 DIAGNOSIS — R35.0 URINE FREQUENCY: ICD-10-CM

## 2024-03-14 DIAGNOSIS — F33.2 SEVERE EPISODE OF RECURRENT MAJOR DEPRESSIVE DISORDER, WITHOUT PSYCHOTIC FEATURES: Primary | ICD-10-CM

## 2024-03-14 LAB
BILIRUB UR QL STRIP: NEGATIVE
CLARITY UR: CLEAR
COLOR UR: YELLOW
GLUCOSE UR STRIP-MCNC: NEGATIVE MG/DL
HGB UR QL STRIP.AUTO: NEGATIVE
KETONES UR QL STRIP: NEGATIVE
LEUKOCYTE ESTERASE UR QL STRIP.AUTO: NEGATIVE
NITRITE UR QL STRIP: NEGATIVE
PH UR STRIP.AUTO: 8 [PH] (ref 5–8)
PROT UR QL STRIP: NEGATIVE
SP GR UR STRIP: 1.01 (ref 1–1.03)
UROBILINOGEN UR QL STRIP: NORMAL

## 2024-03-14 PROCEDURE — 99214 OFFICE O/P EST MOD 30 MIN: CPT | Performed by: INTERNAL MEDICINE

## 2024-03-14 PROCEDURE — 81003 URINALYSIS AUTO W/O SCOPE: CPT

## 2024-03-14 PROCEDURE — 1159F MED LIST DOCD IN RCRD: CPT | Performed by: INTERNAL MEDICINE

## 2024-03-14 PROCEDURE — 1160F RVW MEDS BY RX/DR IN RCRD: CPT | Performed by: INTERNAL MEDICINE

## 2024-03-14 RX ORDER — FLUOXETINE HYDROCHLORIDE 20 MG/1
20 CAPSULE ORAL DAILY
Qty: 30 CAPSULE | Refills: 5 | Status: SHIPPED | OUTPATIENT
Start: 2024-03-14

## 2024-03-14 NOTE — PROGRESS NOTES
"Chief Complaint   Patient presents with    Anxiety    Depression         History:  Juanita Cedillo is a 33 y.o. female who presents today for evaluation of the above problems.      Juanita presents today for 6-month follow-up.    She has been very depressed and anxious.  She was on Zoloft 50 mg but this was not effective and she started to have side effects.  She stopped taking medication.  Referred to [facility] on November 30, 2023, but she did not go.  She states that she does not want to leave the house and is happy with just being at home.  She states \"I do not like people\".  She also believes that she may have ADHD.    She used Chantix to help quit smoking but started to develop side effects.    She recently saw Fernanda MASTERS and was placed on Prozac 10 mg daily.  She notices symptoms are improved, but still present.    Developed low right-sided abdominal tenderness today.  A little nausea this morning.  No chance of pregnancy.  She does notice urinating more.  Does not drink any water.  She drinks coffee and sweet tea throughout the day.    She has no plan of suicide and has never attempted suicide.  If she has thoughts or plan she will talk with her .  She also promises to call the office.      HPI    PHQ-9 Depression Screening  Little interest or pleasure in doing things? 3-->nearly every day   Feeling down, depressed, or hopeless? 2-->more than half the days   Trouble falling or staying asleep, or sleeping too much? 3-->nearly every day   Feeling tired or having little energy? 3-->nearly every day   Poor appetite or overeating? 3-->nearly every day   Feeling bad about yourself - or that you are a failure or have let yourself or your family down? 2-->more than half the days   Trouble concentrating on things, such as reading the newspaper or watching television? 3-->nearly every day   Moving or speaking so slowly that other people could have noticed? Or the opposite - being so fidgety or " restless that you have been moving around a lot more than usual? 3-->nearly every day   Thoughts that you would be better off dead, or of hurting yourself in some way? 2-->more than half the days   PHQ-9 Total Score 24   If you checked off any problems, how difficult have these problems made it for you to do your work, take care of things at home, or get along with other people?             ROS:  Review of Systems  As above      Current Outpatient Medications:     estradiol (ESTRACE VAGINAL) 0.1 MG/GM vaginal cream, Insert 2 applicators into the vagina Daily., Disp: 42.5 g, Rfl: 12    FLUoxetine (PROzac) 20 MG capsule, Take 1 capsule by mouth Daily., Disp: 30 capsule, Rfl: 5    hydrOXYzine (ATARAX) 25 MG tablet, Take 1 tablet by mouth 3 (Three) Times a Day As Needed for Itching., Disp: 90 tablet, Rfl: 3    Current Facility-Administered Medications:     lidocaine (XYLOCAINE) 1 % injection 10 mL, 10 mL, Subcutaneous, Once, Ricarda Francois MD    lidocaine 1% - EPINEPHrine [id number] (XYLOCAINE W/EPI) 1 %-1:[id number] injection 10 mL, 10 mL, Injection, Once, Ricarda Francois MD    Lab Results   Component Value Date    GLUCOSE 82 09/14/2023    BUN 6 09/14/2023    CREATININE 0.60 09/14/2023    EGFR 124.0 09/14/2023    BCR 10.0 09/14/2023    K 4.1 09/14/2023    CO2 26.0 09/14/2023    CALCIUM 9.2 09/14/2023    ALBUMIN 4.7 09/14/2023    BILITOT 0.3 09/14/2023    AST 15 09/14/2023    ALT 10 09/14/2023       WBC   Date Value Ref Range Status   01/29/2024 9.91 3.40 - 10.80 10*3/mm3 Final   04/24/2018 5.7 4.8 - 10.8 K/uL Final     RBC   Date Value Ref Range Status   01/29/2024 4.10 3.77 - 5.28 10*6/mm3 Final     Hemoglobin   Date Value Ref Range Status   01/29/2024 12.5 12.0 - 15.9 g/dL Final   09/14/2023 12.5 12.0 - 15.9 g/dL Final   04/24/2018 13.5 12.0 - 16.0 g/dL Final     Hematocrit   Date Value Ref Range Status   01/29/2024 37.5 34.0 - 46.6 % Final   09/14/2023 40.2 34.0 - 46.6 % Final   04/24/2018 41.7 37.0 -  47.0 % Final     MCV   Date Value Ref Range Status   01/29/2024 91.5 79.0 - 97.0 fL Final   09/14/2023 93.1 79.0 - 97.0 fL Final   04/24/2018 91.9 81.0 - 99.0 fL Final     MCH   Date Value Ref Range Status   01/29/2024 30.5 26.6 - 33.0 pg Final   09/14/2023 28.9 26.6 - 33.0 pg Final   04/24/2018 29.7 27.0 - 31.0 pg Final     MCHC   Date Value Ref Range Status   01/29/2024 33.3 31.5 - 35.7 g/dL Final   09/14/2023 31.1 (L) 31.5 - 35.7 g/dL Final   04/24/2018 32.4 (L) 33.0 - 37.0 g/dL Final     RDW   Date Value Ref Range Status   01/29/2024 12.9 12.3 - 15.4 % Final   09/14/2023 13.5 12.3 - 15.4 % Final   04/24/2018 13.2 11.5 - 14.5 % Final     RDW-SD   Date Value Ref Range Status   09/14/2023 46.1 37.0 - 54.0 fl Final     MPV   Date Value Ref Range Status   09/14/2023 10.1 6.0 - 12.0 fL Final   04/24/2018 10.0 9.4 - 12.3 fL Final     Platelets   Date Value Ref Range Status   01/29/2024 343 140 - 450 10*3/mm3 Final   09/14/2023 332 140 - 450 10*3/mm3 Final   04/24/2018 325 130 - 400 K/uL Final     Neutrophil Rel %   Date Value Ref Range Status   04/24/2018 50.5 50.0 - 65.0 % Final     Neutrophil %   Date Value Ref Range Status   09/12/2022 67.4 42.7 - 76.0 % Final     Lymphocyte Rel %   Date Value Ref Range Status   04/24/2018 39.8 20.0 - 40.0 % Final     Lymphocyte %   Date Value Ref Range Status   09/12/2022 24.7 19.6 - 45.3 % Final     Monocyte Rel %   Date Value Ref Range Status   04/24/2018 4.9 0.0 - 10.0 % Final     Monocyte %   Date Value Ref Range Status   09/12/2022 3.8 (L) 5.0 - 12.0 % Final     Eosinophil Rel %   Date Value Ref Range Status   04/24/2018 3.8 0.0 - 5.0 % Final     Eosinophil %   Date Value Ref Range Status   09/12/2022 3.3 0.3 - 6.2 % Final     Basophil Rel %   Date Value Ref Range Status   04/24/2018 0.7 0.0 - 1.0 % Final     Basophil %   Date Value Ref Range Status   09/12/2022 0.4 0.0 - 1.5 % Final     Immature Grans %   Date Value Ref Range Status   09/12/2022 0.4 0.0 - 0.5 % Final  "    Neutrophils Absolute   Date Value Ref Range Status   04/24/2018 2.9 1.5 - 7.5 K/uL Final     Neutrophils, Absolute   Date Value Ref Range Status   09/12/2022 6.92 1.70 - 7.00 10*3/mm3 Final     Lymphocytes Absolute   Date Value Ref Range Status   04/24/2018 2.3 1.1 - 4.5 K/uL Final     Lymphocytes, Absolute   Date Value Ref Range Status   09/12/2022 2.53 0.70 - 3.10 10*3/mm3 Final     Monocytes Absolute   Date Value Ref Range Status   04/24/2018 0.30 0.00 - 0.90 K/uL Final     Monocytes, Absolute   Date Value Ref Range Status   09/12/2022 0.39 0.10 - 0.90 10*3/mm3 Final     Eosinophils Absolute   Date Value Ref Range Status   04/24/2018 0.20 0.00 - 0.60 K/uL Final     Eosinophils, Absolute   Date Value Ref Range Status   09/12/2022 0.34 0.00 - 0.40 10*3/mm3 Final     Basophils Absolute   Date Value Ref Range Status   04/24/2018 0.00 0.00 - 0.20 K/uL Final     Basophils, Absolute   Date Value Ref Range Status   09/12/2022 0.04 0.00 - 0.20 10*3/mm3 Final     Immature Grans, Absolute   Date Value Ref Range Status   09/12/2022 0.04 0.00 - 0.05 10*3/mm3 Final     nRBC   Date Value Ref Range Status   09/12/2022 0.0 0.0 - 0.2 /100 WBC Final         OBJECTIVE:  Visit Vitals  /62 (BP Location: Right arm, Patient Position: Sitting, Cuff Size: Adult)   Pulse 90   Ht 160 cm (63\")   Wt 51.3 kg (113 lb)   SpO2 99%   BMI 20.02 kg/m²      Physical Exam  Constitutional:       General: She is not in acute distress.     Appearance: She is well-developed. She is not diaphoretic.   HENT:      Head: Normocephalic and atraumatic.   Eyes:      Pupils: Pupils are equal, round, and reactive to light.   Neck:      Thyroid: No thyromegaly.      Trachea: Phonation normal.   Pulmonary:      Effort: No respiratory distress.   Abdominal:      Tenderness: There is abdominal tenderness. There is no rebound.       Skin:     Coloration: Skin is not pale.      Findings: No erythema.   Neurological:      Mental Status: She is alert. "   Psychiatric:         Behavior: Behavior normal.         Thought Content: Thought content normal.         Judgment: Judgment normal.      Comments: Appears to be ambivalent towards her mental health.  At times smiling and appropriate currently likely as a coping mechanism         Assessment/Plan    Diagnoses and all orders for this visit:    1. Severe episode of recurrent major depressive disorder, without psychotic features (Primary)    2. Anxiety    3. Mood disorder  -     FLUoxetine (PROzac) 20 MG capsule; Take 1 capsule by mouth Daily.  Dispense: 30 capsule; Refill: 5    4. Urine frequency  -     Urinalysis With Culture If Indicated -; Future    Highly recommend she establish with a counselor and a psychiatrist, but she declines.  We will increase her Prozac up to 20 mg daily and follow-up with a repeat PHQ-9 in 6 weeks.  Continue as needed hydroxyzine for anxiety.    Obtain urinalysis for evaluation for the urinary frequency and right lower abdominal pain.  I do not believe she has acute appendicitis.  She was instructed to be evaluated either urgent care or ER if symptoms persist or worsen.      Return in about 6 weeks (around 4/25/2024) for Recheck PHQ9 with me.      JOY Carr MD  09:25 CDT  3/14/2024   Electronically signed

## 2024-04-01 ENCOUNTER — TELEPHONE (OUTPATIENT)
Dept: SURGERY | Facility: CLINIC | Age: 34
End: 2024-04-01
Payer: COMMERCIAL

## 2024-04-01 NOTE — TELEPHONE ENCOUNTER
Called spoke to patient she had cancelled her apt for 04/01/2023 she stated that her insurance was cancelled and she would rescheduled when her insure was reinstated

## 2024-04-29 ENCOUNTER — TELEPHONE (OUTPATIENT)
Dept: INTERNAL MEDICINE | Facility: CLINIC | Age: 34
End: 2024-04-29

## 2024-04-29 NOTE — TELEPHONE ENCOUNTER
LETTER SENT TO PATIENT, CALLED TO RESCHEDULE SHE DID NOT WANT TO RESCHEDULE AT THIS TIME. REMINDED OF NO SHOW POLICY

## 2025-01-30 ENCOUNTER — OFFICE VISIT (OUTPATIENT)
Dept: OBSTETRICS AND GYNECOLOGY | Age: 35
End: 2025-01-30

## 2025-01-30 VITALS
WEIGHT: 104 LBS | DIASTOLIC BLOOD PRESSURE: 66 MMHG | HEIGHT: 63 IN | SYSTOLIC BLOOD PRESSURE: 108 MMHG | BODY MASS INDEX: 18.43 KG/M2

## 2025-01-30 DIAGNOSIS — R53.83 OTHER FATIGUE: ICD-10-CM

## 2025-01-30 DIAGNOSIS — Z12.4 SCREENING FOR CERVICAL CANCER: ICD-10-CM

## 2025-01-30 DIAGNOSIS — N89.8 VAGINAL ITCHING: ICD-10-CM

## 2025-01-30 DIAGNOSIS — Z01.419 WOMEN'S ANNUAL ROUTINE GYNECOLOGICAL EXAMINATION: Primary | ICD-10-CM

## 2025-01-30 PROCEDURE — G0123 SCREEN CERV/VAG THIN LAYER: HCPCS | Performed by: NURSE PRACTITIONER

## 2025-01-30 PROCEDURE — 87624 HPV HI-RISK TYP POOLED RSLT: CPT | Performed by: NURSE PRACTITIONER

## 2025-01-30 RX ORDER — HYDROXYZINE HYDROCHLORIDE 25 MG/1
25 TABLET, FILM COATED ORAL 3 TIMES DAILY PRN
Qty: 90 TABLET | Refills: 3 | Status: SHIPPED | OUTPATIENT
Start: 2025-01-30

## 2025-01-30 NOTE — PROGRESS NOTES
"Chief Complaint   Patient presents with    Annual Exam     Pt is here for an annual   Last pap 10/13/22 WNL   Pt c/o still having some itching, states she is still doing the Hydroxyzine and it seems to help some.         History:  Juanita Cedillo is a 34 y.o. female who presents today for follow-up for evaluation of the above:    HPI     Juanita Cedillo is a 34 y.o. female ,  who comes to the office today for annual GYN examination.  Last menstrual period was 01/06/2025.   Periods are regular but very light. Potential perimenopausal symptoms.   Her last Pap smear was 2022, and was normal.   STD screening-declines  Contraception-TL  Her medical history is reviewed.             ROS:  Review of Systems   Constitutional:  Positive for diaphoresis.   HENT: Negative.     Eyes: Negative.    Respiratory: Negative.     Cardiovascular: Negative.    Gastrointestinal: Negative.    Endocrine: Positive for heat intolerance.   Genitourinary: Negative.    Musculoskeletal: Negative.    Skin: Negative.    Neurological: Negative.    Psychiatric/Behavioral:  Positive for agitation. The patient is nervous/anxious.        Ms. Cedillo  reports that she has been smoking cigarettes. She has a 7 pack-year smoking history. She has never used smokeless tobacco. She reports current alcohol use of about 1.0 standard drink of alcohol per week. She reports that she does not use drugs.      Current Outpatient Medications:     FLUoxetine (PROzac) 20 MG capsule, Take 1 capsule by mouth Daily., Disp: 30 capsule, Rfl: 5    hydrOXYzine (ATARAX) 25 MG tablet, Take 1 tablet by mouth 3 (Three) Times a Day As Needed for Itching., Disp: 90 tablet, Rfl: 3  No current facility-administered medications for this visit.      OBJECTIVE:  /66   Ht 160 cm (63\")   Wt 47.2 kg (104 lb)   LMP 01/06/2025   BMI 18.42 kg/m²    Physical Exam  Exam conducted with a chaperone present.   Constitutional:       Appearance: She is well-developed.   HENT:      Head: " Normocephalic and atraumatic.   Eyes:      General: Lids are normal.      Conjunctiva/sclera: Conjunctivae normal.      Pupils: Pupils are equal, round, and reactive to light.   Neck:      Thyroid: No thyromegaly.   Cardiovascular:      Rate and Rhythm: Normal rate and regular rhythm. Tachycardia present.      Heart sounds: Normal heart sounds.   Pulmonary:      Effort: Pulmonary effort is normal.      Breath sounds: Normal breath sounds.   Chest:   Breasts:     Breasts are symmetrical.      Right: No inverted nipple, mass, nipple discharge, skin change or tenderness.      Left: No inverted nipple, mass, nipple discharge, skin change or tenderness.   Abdominal:      General: Bowel sounds are normal.      Palpations: Abdomen is soft.   Genitourinary:     Exam position: Supine.      Labia:         Right: No rash, tenderness, lesion or injury.         Left: No rash, tenderness, lesion or injury.       Vagina: No signs of injury and foreign body. No vaginal discharge, erythema, tenderness or bleeding.      Cervix: No cervical motion tenderness, discharge or friability.      Uterus: Not deviated, not enlarged, not fixed and not tender.       Adnexa:         Right: No mass, tenderness or fullness.          Left: No mass, tenderness or fullness.        Rectum: Normal. No tenderness or external hemorrhoid.   Musculoskeletal:         General: Normal range of motion.      Cervical back: Normal range of motion and neck supple.   Skin:     General: Skin is warm and dry.   Neurological:      Mental Status: She is alert and oriented to person, place, and time.         Assessment/Plan    Diagnoses and all orders for this visit:    1. Women's annual routine gynecological examination (Primary)    2. Screening for cervical cancer  -     Liquid-based Pap Smear, Screening    3. Other fatigue  Comments:  she has f/u with PCP soon. recommend annual routine labs    4. Vaginal itching  -     hydrOXYzine (ATARAX) 25 MG tablet; Take 1 tablet  by mouth 3 (Three) Times a Day As Needed for Itching.  Dispense: 90 tablet; Refill: 3    Preventative and Immunizations:      - Tetanus: Unknown or >10 years ago. Recommend to have at pharmacy or on injury.      - Influenza: recommended annually      - Pneumovax:once after age 65      - Prevnar: Once after age 65      - Zostavax: Once after age 60  Colon Cancer Screening: Due at 45  Mammogram: Due at 40.  PAP: done today  DEXA:  BMD testing (DXA) in all women 65 years of age and older and in postmenopausal women younger than 65 years of age with clinical risk factors for fracture   COVID vaccine information is available at vaccine.[place].gov   Additional preventative recommendations in AVS.      She will return in one year. In the meantime if she develops questions or problems, she will notify the office.      An After Visit Summary was printed and given to the patient at discharge.  Return in about 1 year (around 1/30/2026) for Annual physical. Sooner if problems arise.          Fernanda MASTERS. 1/30/2025   Electronically Signed

## 2025-01-31 LAB
GEN CATEG CVX/VAG CYTO-IMP: NORMAL
HPV I/H RISK 4 DNA CVX QL PROBE+SIG AMP: NOT DETECTED
LAB AP CASE REPORT: NORMAL
LAB AP GYN ADDITIONAL INFORMATION: NORMAL
LAB AP GYN OTHER FINDINGS: NORMAL
Lab: NORMAL
PATH INTERP SPEC-IMP: NORMAL
STAT OF ADQ CVX/VAG CYTO-IMP: NORMAL

## 2025-02-05 ENCOUNTER — OFFICE VISIT (OUTPATIENT)
Dept: INTERNAL MEDICINE | Facility: CLINIC | Age: 35
End: 2025-02-05

## 2025-02-05 ENCOUNTER — LAB (OUTPATIENT)
Dept: LAB | Facility: HOSPITAL | Age: 35
End: 2025-02-05

## 2025-02-05 VITALS
OXYGEN SATURATION: 100 % | DIASTOLIC BLOOD PRESSURE: 74 MMHG | TEMPERATURE: 100.1 F | BODY MASS INDEX: 18.96 KG/M2 | HEART RATE: 102 BPM | WEIGHT: 107 LBS | SYSTOLIC BLOOD PRESSURE: 108 MMHG | HEIGHT: 63 IN

## 2025-02-05 DIAGNOSIS — R79.89 ABNORMAL TSH: ICD-10-CM

## 2025-02-05 DIAGNOSIS — R61 DIAPHORESIS: ICD-10-CM

## 2025-02-05 DIAGNOSIS — Z13.31 DEPRESSION SCREEN: ICD-10-CM

## 2025-02-05 DIAGNOSIS — Z13.6 HYPERTENSION SCREEN: ICD-10-CM

## 2025-02-05 DIAGNOSIS — F17.201 TOBACCO USE DISORDER, MILD, IN SUSTAINED REMISSION: ICD-10-CM

## 2025-02-05 DIAGNOSIS — K59.01 SLOW TRANSIT CONSTIPATION: ICD-10-CM

## 2025-02-05 DIAGNOSIS — R79.89 ABNORMAL TSH: Primary | ICD-10-CM

## 2025-02-05 DIAGNOSIS — Z00.00 ENCOUNTER FOR PREVENTIVE CARE: Primary | ICD-10-CM

## 2025-02-05 LAB
ANION GAP SERPL CALCULATED.3IONS-SCNC: 11 MMOL/L (ref 5–15)
BUN SERPL-MCNC: 6 MG/DL (ref 6–20)
BUN/CREAT SERPL: 10.2 (ref 7–25)
CALCIUM SPEC-SCNC: 9 MG/DL (ref 8.6–10.5)
CHLORIDE SERPL-SCNC: 104 MMOL/L (ref 98–107)
CO2 SERPL-SCNC: 24 MMOL/L (ref 22–29)
CREAT SERPL-MCNC: 0.59 MG/DL (ref 0.57–1)
DEPRECATED RDW RBC AUTO: 44.4 FL (ref 37–54)
EGFRCR SERPLBLD CKD-EPI 2021: 123 ML/MIN/1.73
ERYTHROCYTE [DISTWIDTH] IN BLOOD BY AUTOMATED COUNT: 13.4 % (ref 12.3–15.4)
GLUCOSE SERPL-MCNC: 79 MG/DL (ref 65–99)
HCT VFR BLD AUTO: 39.6 % (ref 34–46.6)
HGB BLD-MCNC: 12.9 G/DL (ref 12–15.9)
MCH RBC QN AUTO: 29.2 PG (ref 26.6–33)
MCHC RBC AUTO-ENTMCNC: 32.6 G/DL (ref 31.5–35.7)
MCV RBC AUTO: 89.6 FL (ref 79–97)
PLATELET # BLD AUTO: 297 10*3/MM3 (ref 140–450)
PMV BLD AUTO: 9.9 FL (ref 6–12)
POTASSIUM SERPL-SCNC: 3.8 MMOL/L (ref 3.5–5.2)
RBC # BLD AUTO: 4.42 10*6/MM3 (ref 3.77–5.28)
SODIUM SERPL-SCNC: 139 MMOL/L (ref 136–145)
T4 FREE SERPL-MCNC: 0.73 NG/DL (ref 0.93–1.7)
TSH SERPL DL<=0.05 MIU/L-ACNC: 6.92 UIU/ML (ref 0.27–4.2)
WBC NRBC COR # BLD AUTO: 4.29 10*3/MM3 (ref 3.4–10.8)

## 2025-02-05 PROCEDURE — 84439 ASSAY OF FREE THYROXINE: CPT

## 2025-02-05 PROCEDURE — 80048 BASIC METABOLIC PNL TOTAL CA: CPT

## 2025-02-05 PROCEDURE — 36415 COLL VENOUS BLD VENIPUNCTURE: CPT

## 2025-02-05 PROCEDURE — 85027 COMPLETE CBC AUTOMATED: CPT

## 2025-02-05 PROCEDURE — 84443 ASSAY THYROID STIM HORMONE: CPT

## 2025-02-05 NOTE — PROGRESS NOTES
Chief Complaint   Patient presents with    Annual Exam         History:  Juainta Cedillo is a 34 y.o. female who presents today for evaluation of the above problems.      HPI  History of Present Illness  The patient is a 34-year-old female who presents for physical.    She reports experiencing severe hot flashes, which have been disruptive to her sleep due to excessive sweating. Her gynecologist has suggested the possibility of perimenopause. She has been self-medicating with over-the-counter pills from [employer], which she reports have been effective in reducing the severity of her symptoms. Despite this, she continues to experience temperature fluctuations at night, feeling either excessively hot or cold, and mood swings. These symptoms have persisted even after 4 days of medication use. She also reports chronic fatigue. She does not want to take any hormonal pills.    She has a history of smoking but quit in 10/2024. She uses a vaping device containing 5 percent nicotine, primarily while driving. She suspects she may have ADHD and was previously prescribed Prozac but discontinued it due to associated fatigue.     She has been prescribed Atarax for her anxiety, OCD and itching, which manifests as skin picking during periods of high anxiety. She also experiences pruritus, particularly on her hands and neck. She has sought counseling for these issues.    She reports experiencing abdominal pain, which she describes as pancreatic in nature. She maintains a diet low in solid foods, preferring to consume fluids throughout the day and eat small amounts at night. She reports a sensation of fullness and nausea after eating. She experiences pain on the right side of her abdomen during physical activity such as walking. Her bowel movements are infrequent, occurring only once a week. She also reports frequent constipation.    Supplemental Information  She is up to date on her eye exam and dental exam.    SOCIAL HISTORY  The  patient quit smoking in October of last year. She used to vape with 5% nicotine.    FAMILY HISTORY  Her mother went through menopause at the age of 37.      Social History     Socioeconomic History    Marital status:    Tobacco Use    Smoking status: Former     Current packs/day: 0.00     Average packs/day: 0.5 packs/day for 14.0 years (7.0 ttl pk-yrs)     Types: Cigarettes     Quit date: 10/2024     Years since quittin.3    Smokeless tobacco: Never   Vaping Use    Vaping status: Every Day   Substance and Sexual Activity    Alcohol use: Yes     Alcohol/week: 1.0 standard drink of alcohol     Types: 1 Drinks containing 0.5 oz of alcohol per week     Comment: once a week    Drug use: No    Sexual activity: Yes     Partners: Male     Birth control/protection: None       PHQ-9 Depression Screening  Little interest or pleasure in doing things? Not at all   Feeling down, depressed, or hopeless? Not at all   PHQ-2 Total Score 0   Trouble falling or staying asleep, or sleeping too much?     Feeling tired or having little energy?     Poor appetite or overeating?     Feeling bad about yourself - or that you are a failure or have let yourself or your family down?     Trouble concentrating on things, such as reading the newspaper or watching television?     Moving or speaking so slowly that other people could have noticed? Or the opposite - being so fidgety or restless that you have been moving around a lot more than usual?     Thoughts that you would be better off dead, or of hurting yourself in some way?     PHQ-9 Total Score     If you checked off any problems, how difficult have these problems made it for you to do your work, take care of things at home, or get along with other people? Not difficult at all       PHQ-9 Total Score:      ROS:  Review of Systems   Constitutional:  Positive for diaphoresis. Negative for activity change, appetite change, fatigue, fever and unexpected weight change.   HENT: Negative.      Eyes: Negative.    Respiratory:  Negative for cough, chest tightness and shortness of breath.    Cardiovascular:  Negative for chest pain, palpitations and leg swelling.   Gastrointestinal:  Positive for abdominal pain and constipation. Negative for diarrhea, nausea and vomiting.   Endocrine: Negative for cold intolerance and heat intolerance.   Genitourinary: Negative.    Musculoskeletal: Negative.  Negative for back pain, neck pain and neck stiffness.   Skin:  Negative for rash and wound.   Neurological:  Negative for dizziness, syncope, weakness, light-headedness and headaches.   Hematological:  Negative for adenopathy. Does not bruise/bleed easily.   Psychiatric/Behavioral:  Negative for agitation, behavioral problems and confusion.          Current Outpatient Medications:     hydrOXYzine (ATARAX) 25 MG tablet, Take 1 tablet by mouth 3 (Three) Times a Day As Needed for Itching., Disp: 90 tablet, Rfl: 3    Lab Results   Component Value Date    GLUCOSE 82 09/14/2023    BUN 6 09/14/2023    CREATININE 0.60 09/14/2023     09/14/2023    K 4.1 09/14/2023     09/14/2023    CALCIUM 9.2 09/14/2023    PROTEINTOT 7.6 09/14/2023    ALBUMIN 4.7 09/14/2023    ALT 10 09/14/2023    AST 15 09/14/2023    ALKPHOS 93 09/14/2023    BILITOT 0.3 09/14/2023    GLOB 2.9 09/14/2023    AGRATIO 1.6 09/14/2023    BCR 10.0 09/14/2023    ANIONGAP 9.0 09/14/2023    EGFR 124.0 09/14/2023       WBC   Date Value Ref Range Status   01/29/2024 9.91 3.40 - 10.80 10*3/mm3 Final   04/24/2018 5.7 4.8 - 10.8 K/uL Final     RBC   Date Value Ref Range Status   01/29/2024 4.10 3.77 - 5.28 10*6/mm3 Final     Hemoglobin   Date Value Ref Range Status   01/29/2024 12.5 12.0 - 15.9 g/dL Final   09/14/2023 12.5 12.0 - 15.9 g/dL Final   04/24/2018 13.5 12.0 - 16.0 g/dL Final     Hematocrit   Date Value Ref Range Status   01/29/2024 37.5 34.0 - 46.6 % Final   09/14/2023 40.2 34.0 - 46.6 % Final   04/24/2018 41.7 37.0 - 47.0 % Final     MCV   Date  Value Ref Range Status   01/29/2024 91.5 79.0 - 97.0 fL Final   09/14/2023 93.1 79.0 - 97.0 fL Final   04/24/2018 91.9 81.0 - 99.0 fL Final     MCH   Date Value Ref Range Status   01/29/2024 30.5 26.6 - 33.0 pg Final   09/14/2023 28.9 26.6 - 33.0 pg Final   04/24/2018 29.7 27.0 - 31.0 pg Final     MCHC   Date Value Ref Range Status   01/29/2024 33.3 31.5 - 35.7 g/dL Final   09/14/2023 31.1 (L) 31.5 - 35.7 g/dL Final   04/24/2018 32.4 (L) 33.0 - 37.0 g/dL Final     RDW   Date Value Ref Range Status   01/29/2024 12.9 12.3 - 15.4 % Final   09/14/2023 13.5 12.3 - 15.4 % Final   04/24/2018 13.2 11.5 - 14.5 % Final     RDW-SD   Date Value Ref Range Status   09/14/2023 46.1 37.0 - 54.0 fl Final     MPV   Date Value Ref Range Status   09/14/2023 10.1 6.0 - 12.0 fL Final   04/24/2018 10.0 9.4 - 12.3 fL Final     Platelets   Date Value Ref Range Status   01/29/2024 343 140 - 450 10*3/mm3 Final   09/14/2023 332 140 - 450 10*3/mm3 Final   04/24/2018 325 130 - 400 K/uL Final     Neutrophil Rel %   Date Value Ref Range Status   04/24/2018 50.5 50.0 - 65.0 % Final     Neutrophil %   Date Value Ref Range Status   09/12/2022 67.4 42.7 - 76.0 % Final     Lymphocyte Rel %   Date Value Ref Range Status   04/24/2018 39.8 20.0 - 40.0 % Final     Lymphocyte %   Date Value Ref Range Status   09/12/2022 24.7 19.6 - 45.3 % Final     Monocyte Rel %   Date Value Ref Range Status   04/24/2018 4.9 0.0 - 10.0 % Final     Monocyte %   Date Value Ref Range Status   09/12/2022 3.8 (L) 5.0 - 12.0 % Final     Eosinophil Rel %   Date Value Ref Range Status   04/24/2018 3.8 0.0 - 5.0 % Final     Eosinophil %   Date Value Ref Range Status   09/12/2022 3.3 0.3 - 6.2 % Final     Basophil Rel %   Date Value Ref Range Status   04/24/2018 0.7 0.0 - 1.0 % Final     Basophil %   Date Value Ref Range Status   09/12/2022 0.4 0.0 - 1.5 % Final     Immature Grans %   Date Value Ref Range Status   09/12/2022 0.4 0.0 - 0.5 % Final     Neutrophils Absolute   Date  "Value Ref Range Status   04/24/2018 2.9 1.5 - 7.5 K/uL Final     Neutrophils, Absolute   Date Value Ref Range Status   09/12/2022 6.92 1.70 - 7.00 10*3/mm3 Final     Lymphocytes Absolute   Date Value Ref Range Status   04/24/2018 2.3 1.1 - 4.5 K/uL Final     Lymphocytes, Absolute   Date Value Ref Range Status   09/12/2022 2.53 0.70 - 3.10 10*3/mm3 Final     Monocytes Absolute   Date Value Ref Range Status   04/24/2018 0.30 0.00 - 0.90 K/uL Final     Monocytes, Absolute   Date Value Ref Range Status   09/12/2022 0.39 0.10 - 0.90 10*3/mm3 Final     Eosinophils Absolute   Date Value Ref Range Status   04/24/2018 0.20 0.00 - 0.60 K/uL Final     Eosinophils, Absolute   Date Value Ref Range Status   09/12/2022 0.34 0.00 - 0.40 10*3/mm3 Final     Basophils Absolute   Date Value Ref Range Status   04/24/2018 0.00 0.00 - 0.20 K/uL Final     Basophils, Absolute   Date Value Ref Range Status   09/12/2022 0.04 0.00 - 0.20 10*3/mm3 Final     Immature Grans, Absolute   Date Value Ref Range Status   09/12/2022 0.04 0.00 - 0.05 10*3/mm3 Final     nRBC   Date Value Ref Range Status   09/12/2022 0.0 0.0 - 0.2 /100 WBC Final         OBJECTIVE:  Visit Vitals  /74 (BP Location: Left arm, Patient Position: Sitting, Cuff Size: Adult)   Pulse 102   Temp 100.1 °F (37.8 °C) (Temporal)   Ht 160 cm (63\")   Wt 48.5 kg (107 lb)   LMP 01/06/2025   SpO2 100%   BMI 18.95 kg/m²      Physical Exam  Vitals and nursing note reviewed.   Constitutional:       General: She is not in acute distress.     Appearance: Normal appearance. She is well-developed. She is not ill-appearing or toxic-appearing.   HENT:      Head: Normocephalic and atraumatic.      Right Ear: Tympanic membrane, ear canal and external ear normal. There is no impacted cerumen.      Left Ear: Tympanic membrane, ear canal and external ear normal. There is no impacted cerumen.   Eyes:      General: No scleral icterus.     Conjunctiva/sclera: Conjunctivae normal.      Pupils: Pupils " are equal, round, and reactive to light.   Neck:      Thyroid: No thyromegaly.      Vascular: No JVD.   Cardiovascular:      Rate and Rhythm: Normal rate and regular rhythm.      Heart sounds: Normal heart sounds. No murmur heard.     No friction rub. No gallop.   Pulmonary:      Effort: Pulmonary effort is normal. No respiratory distress.      Breath sounds: Normal breath sounds. No stridor. No wheezing, rhonchi or rales.   Abdominal:      General: Abdomen is flat. There is no distension.      Palpations: Abdomen is soft. There is no mass.      Tenderness: There is abdominal tenderness. There is no guarding or rebound.      Comments: Diffusely tender abdomen without evidence of guarding.   Musculoskeletal:      Right lower leg: No edema.      Left lower leg: No edema.   Skin:     General: Skin is warm and dry.      Coloration: Skin is not jaundiced.   Neurological:      Mental Status: She is alert.      Cranial Nerves: No cranial nerve deficit.   Psychiatric:         Mood and Affect: Mood normal.         Behavior: Behavior normal.         Thought Content: Thought content normal.         Judgment: Judgment normal.       Physical Exam  Lungs were auscultated.  Heart was examined.  Abdominal exam was performed.    Vital Signs  Temperature is 100.1.      Assessment/Plan    Diagnoses and all orders for this visit:    1. Encounter for preventive care (Primary)    2. Depression screen    3. Diaphoresis  -     CBC (No Diff); Future  -     Basic metabolic panel; Future  -     TSH; Future    4. Slow transit constipation    5. Tobacco use disorder, mild, in sustained remission    6. Hypertension screen      Assessment & Plan    Patient presents today for a physical examination.  Overall, she is feeling okay.  Depression screening was negative with a PHQ 2 of 0.  Hypertension screen is negative blood pressure 108/74.    She reports experiencing hot flashes and mood swings, which have improved with the use of over-the-counter  pills from [employer]. She will continue with these pills as they do not contain estrogen. Basic labs, including TSH, CBC, and a basic metabolic panel, will be ordered to rule out other potential causes such as thyroid dysfunction.    Anxiety.  She experiences anxiety and has been prescribed Atarax (hydroxyzine) to help manage her symptoms, including itching related to OCD. She will continue with hydroxyzine as it helps with both anxiety and itching.     Constipation.  She reports having bowel movements only once a week, which is not normal. She is advised to take MiraLAX, one capful daily, mixed with water. She is also encouraged to consume protein shakes and smoothies to increase her caloric intake.    Counseling/Anticipatory Guidance Discussed: nutrition, healthy weight, misuse of tobacco, alcohol and drugs, dental health, mental health, and immunizations    BMI is within normal parameters. No other follow-up for BMI required.      Return in about 1 year (around 2/5/2026) for Annual physical.    Patient was given instructions and counseling regarding his/her condition or for health maintenance advice. Please see specific information pulled into the AVS if appropriate.     JOY Carr MD  09:09 CST  2/5/2025   Electronically signed    Patient or patient representative verbalized consent for the use of Ambient Listening during the visit with  DEQUAN Carr MD for chart documentation. 2/5/2025  09:37 CST

## 2025-02-06 DIAGNOSIS — E03.9 HYPOTHYROIDISM, UNSPECIFIED TYPE: Primary | ICD-10-CM

## 2025-02-06 RX ORDER — LEVOTHYROXINE SODIUM 50 UG/1
50 TABLET ORAL
Qty: 30 TABLET | Refills: 2 | Status: SHIPPED | OUTPATIENT
Start: 2025-02-06

## 2025-03-24 DIAGNOSIS — F39 MOOD DISORDER: ICD-10-CM

## 2025-03-24 DIAGNOSIS — F33.2 SEVERE EPISODE OF RECURRENT MAJOR DEPRESSIVE DISORDER, WITHOUT PSYCHOTIC FEATURES: ICD-10-CM

## 2025-03-25 NOTE — TELEPHONE ENCOUNTER
Rx Refill Note  Requested Prescriptions     Pending Prescriptions Disp Refills    FLUoxetine (PROzac) 20 MG capsule [Pharmacy Med Name: FLUOXETINE 20MG CAPSULES] 30 capsule 5     Sig: TAKE 1 CAPSULE BY MOUTH DAILY      Last office visit with prescribing clinician: 2/5/2025   Last telemedicine visit with prescribing clinician: Visit date not found   Next office visit with prescribing clinician: 2/9/2026                         Would you like a call back once the refill request has been completed: [] Yes [] No    If the office needs to give you a call back, can they leave a voicemail: [] Yes [] No    Dudley Reyes MA  03/25/25, 09:46 CDT

## 2025-07-22 DIAGNOSIS — N89.8 VAGINAL ITCHING: ICD-10-CM

## 2025-07-22 RX ORDER — HYDROXYZINE HYDROCHLORIDE 25 MG/1
25 TABLET, FILM COATED ORAL 3 TIMES DAILY PRN
Qty: 90 TABLET | Refills: 3 | Status: SHIPPED | OUTPATIENT
Start: 2025-07-22

## (undated) DEVICE — GLV SURG TRIUMPH MICRO PF LTX 7.5 STRL

## (undated) DEVICE — ENSEAL X1 TISSUE SEALER, CURVED JAW, 25 CM SHAFT LENGTH: Brand: ENSEAL

## (undated) DEVICE — SUT MONOCRYL PLS ANTIB UND 3/0  PS1 27IN

## (undated) DEVICE — SYR LL TP 10ML STRL

## (undated) DEVICE — PK LAP GYN 30

## (undated) DEVICE — INTENDED FOR TISSUE SEPARATION, AND OTHER PROCEDURES THAT REQUIRE A SHARP SURGICAL BLADE TO PUNCTURE OR CUT.: Brand: BARD-PARKER ®  SAFETY SCALPED

## (undated) DEVICE — ENDOPATH XCEL WITH OPTIVIEW TECHNOLOGY UNIVERSAL TROCAR STABILITY SLEEVES: Brand: ENDOPATH XCEL OPTIVIEW

## (undated) DEVICE — PK TURNOVER RM ADV

## (undated) DEVICE — SYR CONTRL LUERLOK 10CC

## (undated) DEVICE — ENDOPATH XCEL WITH OPTIVIEW TECHNOLOGY DILATING TIP TROCARS WITH STABILITY SLEEVES: Brand: ENDOPATH XCEL OPTIVIEW